# Patient Record
Sex: FEMALE | Race: BLACK OR AFRICAN AMERICAN | Employment: OTHER | URBAN - METROPOLITAN AREA
[De-identification: names, ages, dates, MRNs, and addresses within clinical notes are randomized per-mention and may not be internally consistent; named-entity substitution may affect disease eponyms.]

---

## 2017-05-11 ENCOUNTER — OFFICE VISIT (OUTPATIENT)
Dept: OBGYN CLINIC | Age: 77
End: 2017-05-11
Payer: MEDICARE

## 2017-05-11 VITALS
SYSTOLIC BLOOD PRESSURE: 150 MMHG | HEIGHT: 67 IN | DIASTOLIC BLOOD PRESSURE: 90 MMHG | WEIGHT: 216 LBS | BODY MASS INDEX: 33.9 KG/M2

## 2017-05-11 DIAGNOSIS — Z01.419 NORMAL GYNECOLOGIC EXAMINATION: Primary | ICD-10-CM

## 2017-05-11 DIAGNOSIS — Z78.0 MENOPAUSE: ICD-10-CM

## 2017-05-11 DIAGNOSIS — Z12.31 SCREENING MAMMOGRAM, ENCOUNTER FOR: ICD-10-CM

## 2017-05-11 DIAGNOSIS — Z12.11 ENCOUNTER FOR SCREENING FECAL OCCULT BLOOD TESTING: ICD-10-CM

## 2017-05-11 DIAGNOSIS — R29.890 LOSS OF HEIGHT: ICD-10-CM

## 2017-05-11 PROCEDURE — G0101 CA SCREEN;PELVIC/BREAST EXAM: HCPCS | Performed by: OBSTETRICS & GYNECOLOGY

## 2017-05-11 RX ORDER — NIFEDIPINE 30 MG/1
30 TABLET, FILM COATED, EXTENDED RELEASE ORAL DAILY
COMMUNITY

## 2017-05-11 RX ORDER — ATENOLOL 50 MG/1
50 TABLET ORAL DAILY
COMMUNITY

## 2017-05-11 RX ORDER — ESTRADIOL 0.1 MG/G
2 CREAM VAGINAL DAILY
COMMUNITY
End: 2017-05-11 | Stop reason: SDUPTHER

## 2017-05-11 RX ORDER — LEVOTHYROXINE SODIUM 0.15 MG/1
150 TABLET ORAL DAILY
COMMUNITY

## 2017-05-11 RX ORDER — NIACIN 250 MG
250 TABLET, EXTENDED RELEASE ORAL NIGHTLY
COMMUNITY
End: 2018-06-27 | Stop reason: ALTCHOICE

## 2017-05-11 RX ORDER — ASCORBIC ACID 500 MG
500 TABLET ORAL DAILY
COMMUNITY
End: 2021-05-10

## 2017-05-11 RX ORDER — ESTRADIOL 0.1 MG/G
1 CREAM VAGINAL
Qty: 1 TUBE | Refills: 5 | Status: SHIPPED | OUTPATIENT
Start: 2017-05-11 | End: 2021-05-10

## 2017-05-11 RX ORDER — ASPIRIN 81 MG/1
81 TABLET ORAL DAILY
COMMUNITY

## 2017-05-11 RX ORDER — HYDROCHLOROTHIAZIDE 25 MG/1
25 TABLET ORAL DAILY
COMMUNITY

## 2017-05-11 ASSESSMENT — ENCOUNTER SYMPTOMS
ABDOMINAL PAIN: 0
SHORTNESS OF BREATH: 0
ABDOMINAL DISTENTION: 0
DIARRHEA: 0
BACK PAIN: 0
CONSTIPATION: 0
COUGH: 0

## 2018-06-27 ENCOUNTER — OFFICE VISIT (OUTPATIENT)
Dept: OBGYN CLINIC | Age: 78
End: 2018-06-27
Payer: MEDICARE

## 2018-06-27 VITALS
BODY MASS INDEX: 32.65 KG/M2 | HEIGHT: 67 IN | WEIGHT: 208 LBS | SYSTOLIC BLOOD PRESSURE: 128 MMHG | DIASTOLIC BLOOD PRESSURE: 70 MMHG

## 2018-06-27 DIAGNOSIS — Z01.419 VISIT FOR GYNECOLOGIC EXAMINATION: Primary | ICD-10-CM

## 2018-06-27 DIAGNOSIS — Z12.39 SCREENING FOR BREAST CANCER: ICD-10-CM

## 2018-06-27 PROCEDURE — G0101 CA SCREEN;PELVIC/BREAST EXAM: HCPCS | Performed by: OBSTETRICS & GYNECOLOGY

## 2018-06-27 ASSESSMENT — ENCOUNTER SYMPTOMS
BACK PAIN: 0
COUGH: 0
DIARRHEA: 0
ABDOMINAL PAIN: 0
CONSTIPATION: 0
ABDOMINAL DISTENTION: 0
SHORTNESS OF BREATH: 0

## 2020-01-13 ENCOUNTER — HOSPITAL ENCOUNTER (OUTPATIENT)
Age: 80
Setting detail: SPECIMEN
Discharge: HOME | End: 2020-01-13
Payer: MEDICARE

## 2020-01-13 ENCOUNTER — OFFICE VISIT (OUTPATIENT)
Dept: OBGYN CLINIC | Age: 80
End: 2020-01-13
Payer: MEDICARE

## 2020-01-13 VITALS
BODY MASS INDEX: 31.39 KG/M2 | WEIGHT: 200 LBS | HEIGHT: 67 IN | DIASTOLIC BLOOD PRESSURE: 82 MMHG | SYSTOLIC BLOOD PRESSURE: 148 MMHG

## 2020-01-13 LAB
BILIRUBIN URINE: NEGATIVE
COLOR: YELLOW
COMMENT UA: NORMAL
GLUCOSE URINE: NEGATIVE
KETONES, URINE: NEGATIVE
LEUKOCYTE ESTERASE, URINE: NEGATIVE
NITRITE, URINE: NEGATIVE
PH UA: 5 (ref 5–8)
PROTEIN UA: NEGATIVE
SPECIFIC GRAVITY UA: 1.01 (ref 1–1.03)
TURBIDITY: CLEAR
URINE HGB: NEGATIVE
UROBILINOGEN, URINE: NORMAL

## 2020-01-13 PROCEDURE — 1123F ACP DISCUSS/DSCN MKR DOCD: CPT | Performed by: OBSTETRICS & GYNECOLOGY

## 2020-01-13 PROCEDURE — 4004F PT TOBACCO SCREEN RCVD TLK: CPT | Performed by: OBSTETRICS & GYNECOLOGY

## 2020-01-13 PROCEDURE — G8400 PT W/DXA NO RESULTS DOC: HCPCS | Performed by: OBSTETRICS & GYNECOLOGY

## 2020-01-13 PROCEDURE — G8427 DOCREV CUR MEDS BY ELIG CLIN: HCPCS | Performed by: OBSTETRICS & GYNECOLOGY

## 2020-01-13 PROCEDURE — 4040F PNEUMOC VAC/ADMIN/RCVD: CPT | Performed by: OBSTETRICS & GYNECOLOGY

## 2020-01-13 PROCEDURE — 99213 OFFICE O/P EST LOW 20 MIN: CPT | Performed by: OBSTETRICS & GYNECOLOGY

## 2020-01-13 PROCEDURE — G8419 CALC BMI OUT NRM PARAM NOF/U: HCPCS | Performed by: OBSTETRICS & GYNECOLOGY

## 2020-01-13 PROCEDURE — G8484 FLU IMMUNIZE NO ADMIN: HCPCS | Performed by: OBSTETRICS & GYNECOLOGY

## 2020-01-13 PROCEDURE — 1090F PRES/ABSN URINE INCON ASSESS: CPT | Performed by: OBSTETRICS & GYNECOLOGY

## 2020-01-13 NOTE — PROGRESS NOTES
Patient is a 67 y.o. Veronica Tunica  seen with total face to face time of 15 minutes. More than 50% of this visit was on counseling and education regarding her    Diagnosis Orders   1. Vaginal itching  VAGINITIS DNA PROBE    Urinalysis Reflex to Culture    and her options. She was also counseled on her preventative health maintenance recommendations and follow-up. Blood pressure (!) 148/82, height 5' 7\" (1.702 m), weight 200 lb (90.7 kg), not currently breastfeeding. No results found for this or any previous visit (from the past 8736 hour(s)). The patient has noticed that since she was switched from one generic estradiol cream to another, she seems to have vaginal itching. She was using estradiol 0.01% cream from [employer] and would like to go back to that. I will check a urinalysis and have sent a vaginal DNA probe to make sure that we are not dealing with an infection. Otherwise, unfortunately, I do not seem to be able to put the right prescription into our computer. Since Dr. Zakia Govea has filled this before, I suggest that she call him and have him refill it but add the proviso that it should be filled from [address] [place] [address].    IMP:   Encounter Diagnosis   Name Primary?     Vaginal itching Yes         PLAN: SVETA hutchinsn

## 2020-01-13 NOTE — LETTER
January 13, 2020     Luciana Kiran, [address] [address]    Patient: Desiree Valle  MR Number: [id number]  YOB: 1940  Date of Visit: 1/13/2020    Dear Dr. Luciana Kiran:    I saw our mutual patients, Aurelia Smiley today. The patient has noticed that since she was switched from one generic estradiol cream to another, she seems to have vaginal itching. She was using estradiol 0.01% cream from [employer] and would like to go back to that. I will check a urinalysis and have sent a vaginal DNA probe to make sure that we are not dealing with an infection. Otherwise, unfortunately, I do not seem to be able to put the right prescription into our computer. Since you have filled this before, I suggest that she call you and have you refill it but add the proviso that it should be filled from [address].    If you have questions, please do not hesitate to call me. I look forward to following Veronika Espitia along with you.     Sincerely,        Stephen Dunham MD

## 2020-01-14 ENCOUNTER — TELEPHONE (OUTPATIENT)
Dept: OBGYN CLINIC | Age: 80
End: 2020-01-14

## 2020-01-14 LAB
DIRECT EXAM: ABNORMAL
Lab: ABNORMAL
SPECIMEN DESCRIPTION: ABNORMAL

## 2020-01-14 NOTE — TELEPHONE ENCOUNTER
----- Message from Caleb Causey MD sent at 1/14/2020  1:45 PM EST -----  Would you please call her to say the probe showed both  BV and yeast.  If she'd like to be treated for it, call in Flagyl 500 bid x 7 d. And Diflucan 150 mg.   Thanks

## 2020-01-15 RX ORDER — FLUCONAZOLE 150 MG/1
150 TABLET ORAL ONCE
Qty: 1 TABLET | Refills: 0 | Status: SHIPPED | OUTPATIENT
Start: 2020-01-21 | End: 2020-01-21

## 2020-01-15 RX ORDER — METRONIDAZOLE 500 MG/1
500 TABLET ORAL 2 TIMES DAILY
Qty: 14 TABLET | Refills: 0 | Status: SHIPPED | OUTPATIENT
Start: 2020-01-15 | End: 2020-01-22

## 2020-01-15 RX ORDER — METRONIDAZOLE 500 MG/1
500 TABLET ORAL 2 TIMES DAILY
COMMUNITY
Start: 2020-01-15 | End: 2020-01-15 | Stop reason: SDUPTHER

## 2020-01-15 RX ORDER — FLUCONAZOLE 150 MG/1
150 TABLET ORAL ONCE
COMMUNITY
Start: 2020-01-21 | End: 2020-01-15 | Stop reason: SDUPTHER

## 2020-09-15 ENCOUNTER — TELEPHONE (OUTPATIENT)
Dept: OBGYN CLINIC | Age: 80
End: 2020-09-15

## 2020-09-15 NOTE — TELEPHONE ENCOUNTER
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 Called pt to f/u on her request.   08/20/20 mammogram done. 08/05/20 Lt diag mamm and US done. 09/04/20 Lt br Bx  (all testing done at [facility] clinic)    Pt calling today to say that her PCP Veda Tabor called her to tell her that she had a bad breast bx result and needs to go to breast surgeon but the provider they referred her to doesn't take her insurance. Pt states she is having problems finding provider that is taking [employer]. Informed pt that Patricia Farah now retired but we have a list of all surgeons he would refer too. Informed pt that the office will need to get results from [facility], referrals will need to be sent with test results included and then we can contact those providers to see if they take [facility] [employer]. Attempted to call [facility] records. They will not release the records because [facility] was not provider that had ordered these. Pt would need to sign a release of records. Veda Tabor office spoke with [facility]. She said she can't release other doctors release without medical release of records.

## 2020-09-15 NOTE — TELEPHONE ENCOUNTER
Called pt back to let her know [facility] clinic will not release records without signed release of records. Called [facility] office regarding medical testing that Carey Gonzalez had called her about and Tavares Fisher states that they can't release records to another office without release of records.

## 2020-09-17 NOTE — TELEPHONE ENCOUNTER
Notified pt that Julius Lion does take [employer]. Pt asking if Luis Antonio Harris takes [employer], she is closer to her home but if they both take it. She would like referral to both. Called  [address] [facility] [address] office, [facility] checking to see if they take [employer]?

## 2021-03-10 ENCOUNTER — TELEPHONE (OUTPATIENT)
Dept: OBGYN CLINIC | Age: 81
End: 2021-03-10

## 2021-03-10 ENCOUNTER — OFFICE VISIT (OUTPATIENT)
Dept: OBGYN CLINIC | Age: 81
End: 2021-03-10
Payer: MEDICARE

## 2021-03-10 ENCOUNTER — HOSPITAL ENCOUNTER (OUTPATIENT)
Age: 81
Setting detail: SPECIMEN
Discharge: HOME | End: 2021-03-10
Payer: MEDICARE

## 2021-03-10 VITALS
HEIGHT: 67 IN | WEIGHT: 214 LBS | DIASTOLIC BLOOD PRESSURE: 80 MMHG | SYSTOLIC BLOOD PRESSURE: 132 MMHG | BODY MASS INDEX: 33.59 KG/M2

## 2021-03-10 DIAGNOSIS — N89.8 VAGINAL ITCHING: ICD-10-CM

## 2021-03-10 DIAGNOSIS — N89.8 VAGINAL ITCHING: Primary | ICD-10-CM

## 2021-03-10 PROBLEM — G56.03 CARPAL TUNNEL SYNDROME ON BOTH SIDES: Status: ACTIVE | Noted: 2017-05-04

## 2021-03-10 PROBLEM — L73.2 HIDRADENITIS AXILLARIS: Status: ACTIVE | Noted: 2017-11-09

## 2021-03-10 PROBLEM — C50.912 LOBULAR CARCINOMA OF LEFT BREAST (HCC): Status: ACTIVE | Noted: 2020-09-11

## 2021-03-10 LAB
DIRECT EXAM: NORMAL
Lab: NORMAL
SPECIMEN DESCRIPTION: NORMAL

## 2021-03-10 PROCEDURE — 4040F PNEUMOC VAC/ADMIN/RCVD: CPT | Performed by: NURSE PRACTITIONER

## 2021-03-10 PROCEDURE — 1090F PRES/ABSN URINE INCON ASSESS: CPT | Performed by: NURSE PRACTITIONER

## 2021-03-10 PROCEDURE — G8484 FLU IMMUNIZE NO ADMIN: HCPCS | Performed by: NURSE PRACTITIONER

## 2021-03-10 PROCEDURE — 1123F ACP DISCUSS/DSCN MKR DOCD: CPT | Performed by: NURSE PRACTITIONER

## 2021-03-10 PROCEDURE — 99213 OFFICE O/P EST LOW 20 MIN: CPT | Performed by: NURSE PRACTITIONER

## 2021-03-10 PROCEDURE — G8400 PT W/DXA NO RESULTS DOC: HCPCS | Performed by: NURSE PRACTITIONER

## 2021-03-10 PROCEDURE — G8417 CALC BMI ABV UP PARAM F/U: HCPCS | Performed by: NURSE PRACTITIONER

## 2021-03-10 PROCEDURE — G8427 DOCREV CUR MEDS BY ELIG CLIN: HCPCS | Performed by: NURSE PRACTITIONER

## 2021-03-10 PROCEDURE — 4004F PT TOBACCO SCREEN RCVD TLK: CPT | Performed by: NURSE PRACTITIONER

## 2021-03-10 RX ORDER — ANASTROZOLE 1 MG/1
TABLET ORAL DAILY
COMMUNITY
End: 2021-05-10

## 2021-03-10 NOTE — PROGRESS NOTES
Vaginitis: Patient complains of vaginal itching- mainly external- pt states from 6-9 o'clock for a couple of weeks  Pt has BrCa- scheduling mastectomy soon. Vaginal symptoms include none. Vulvar symptoms include local irritation. STI Risk: Very low risk of STD exposure   Discharge described as: normal and physiologic . /80 (Position: Sitting, Cuff Size: Large Adult)   Ht 5' 7\" (1.702 m)   Wt 214 lb (97.1 kg)   BMI 33.52 kg/m²     ALLERGIES:    O-  Physical Exam  Genitourinary:     General: Normal vulva. Labia:         Right: No rash, tenderness, lesion or injury. Left: No rash, tenderness, lesion or injury. Vagina: Normal. No foreign body. No vaginal discharge, erythema, tenderness, bleeding or lesions. Vulva, labia wnl. No redness, rash or lesions noted in areas where pt c/o itching  A. Vaginitis  options. P. Affirm collected and sent to lab  hydrocortisone OTC tid x 7 days. If not improving after 14 days, call office and will consider rx for stronger steroid  Will treat results accordingly  She was also counseled on her preventative health maintenance recommendations and follow-up.   RTO annual exam and PRN

## 2021-03-10 NOTE — TELEPHONE ENCOUNTER
[de-identified] y/o Pt calling to say she went to 2 pharmacy's looking for 2% hydrocortisone. She purchased 1% and asked if she needs Rx sent?

## 2021-03-31 ENCOUNTER — TELEPHONE (OUTPATIENT)
Dept: OBGYN CLINIC | Age: 81
End: 2021-03-31

## 2021-03-31 NOTE — TELEPHONE ENCOUNTER
I'm so glad she is feeling better. Use water primarily to wash with and only a mild soap (baby shampoo) to wash with  Agree w/instructions for coconut oil.

## 2021-03-31 NOTE — TELEPHONE ENCOUNTER
[de-identified] y/o Pt calling to let [facility] NP know that her itching seems to be gone. Pt wanting to confirm that she can use coconut oil internally and externally at vaginal opening. Pt asking how to apply oil. Advised:  -will let provider know of her improvement.  -she may use within vaginal vault and externally coconut oil.  -she may apply it with her fingers or use cotton ball.

## 2021-05-10 ENCOUNTER — HOSPITAL ENCOUNTER (OUTPATIENT)
Dept: PREADMISSION TESTING | Age: 81
Discharge: HOME | End: 2021-05-14
Payer: MEDICARE

## 2021-05-10 VITALS
OXYGEN SATURATION: 92 % | WEIGHT: 214.9 LBS | HEIGHT: 68 IN | SYSTOLIC BLOOD PRESSURE: 178 MMHG | DIASTOLIC BLOOD PRESSURE: 83 MMHG | RESPIRATION RATE: 16 BRPM | BODY MASS INDEX: 32.57 KG/M2 | HEART RATE: 82 BPM

## 2021-05-10 LAB
ABSOLUTE EOS #: 0.33 K/UL (ref 0–0.44)
ABSOLUTE IMMATURE GRANULOCYTE: 0.04 K/UL (ref 0–0.3)
ABSOLUTE LYMPH #: 2.12 K/UL (ref 1.1–3.7)
ABSOLUTE MONO #: 0.85 K/UL (ref 0.1–1.2)
ALBUMIN SERPL-MCNC: 4.3 G/DL (ref 3.5–5.2)
ALBUMIN/GLOBULIN RATIO: ABNORMAL (ref 1–2.5)
ALP BLD-CCNC: 113 U/L (ref 35–104)
ALT SERPL-CCNC: 11 U/L (ref 5–33)
ANION GAP SERPL CALCULATED.3IONS-SCNC: 16 MMOL/L (ref 9–17)
AST SERPL-CCNC: 12 U/L
BASOPHILS # BLD: 1 % (ref 0–2)
BASOPHILS ABSOLUTE: 0.04 K/UL (ref 0–0.2)
BILIRUB SERPL-MCNC: 0.33 MG/DL (ref 0.3–1.2)
BUN BLDV-MCNC: 13 MG/DL (ref 8–23)
BUN/CREAT BLD: 15 (ref 9–20)
CALCIUM SERPL-MCNC: 9.6 MG/DL (ref 8.6–10.4)
CHLORIDE BLD-SCNC: 100 MMOL/L (ref 98–107)
CO2: 22 MMOL/L (ref 20–31)
CREAT SERPL-MCNC: 0.84 MG/DL (ref 0.5–0.9)
DIFFERENTIAL TYPE: ABNORMAL
EOSINOPHILS RELATIVE PERCENT: 4 % (ref 1–4)
GFR AFRICAN AMERICAN: >60 ML/MIN
GFR NON-AFRICAN AMERICAN: >60 ML/MIN
GFR SERPL CREATININE-BSD FRML MDRD: ABNORMAL ML/MIN/{1.73_M2}
GFR SERPL CREATININE-BSD FRML MDRD: ABNORMAL ML/MIN/{1.73_M2}
GLUCOSE BLD-MCNC: 191 MG/DL (ref 70–99)
HCT VFR BLD CALC: 47.6 % (ref 36.3–47.1)
HEMOGLOBIN: 15.8 G/DL (ref 11.9–15.1)
IMMATURE GRANULOCYTES: 1 %
LYMPHOCYTES # BLD: 25 % (ref 24–43)
MCH RBC QN AUTO: 29.9 PG (ref 25.2–33.5)
MCHC RBC AUTO-ENTMCNC: 33.2 G/DL (ref 28.4–34.8)
MCV RBC AUTO: 90 FL (ref 82.6–102.9)
MONOCYTES # BLD: 10 % (ref 3–12)
NRBC AUTOMATED: 0 PER 100 WBC
PDW BLD-RTO: 11.9 % (ref 11.8–14.4)
PLATELET # BLD: 323 K/UL (ref 138–453)
PLATELET ESTIMATE: ABNORMAL
PMV BLD AUTO: 11.1 FL (ref 8.1–13.5)
POTASSIUM SERPL-SCNC: 3.6 MMOL/L (ref 3.7–5.3)
RBC # BLD: 5.29 M/UL (ref 3.95–5.11)
RBC # BLD: ABNORMAL 10*6/UL
SEG NEUTROPHILS: 59 % (ref 36–65)
SEGMENTED NEUTROPHILS ABSOLUTE COUNT: 5.21 K/UL (ref 1.5–8.1)
SODIUM BLD-SCNC: 138 MMOL/L (ref 135–144)
TOTAL PROTEIN: 7.5 G/DL (ref 6.4–8.3)
WBC # BLD: 8.6 K/UL (ref 3.5–11.3)
WBC # BLD: ABNORMAL 10*3/UL

## 2021-05-10 PROCEDURE — 85025 COMPLETE CBC W/AUTO DIFF WBC: CPT

## 2021-05-10 PROCEDURE — 36415 COLL VENOUS BLD VENIPUNCTURE: CPT

## 2021-05-10 PROCEDURE — 93005 ELECTROCARDIOGRAM TRACING: CPT | Performed by: ANESTHESIOLOGY

## 2021-05-10 PROCEDURE — 80053 COMPREHEN METABOLIC PANEL: CPT

## 2021-05-10 PROCEDURE — 83036 HEMOGLOBIN GLYCOSYLATED A1C: CPT

## 2021-05-10 RX ORDER — LORATADINE 10 MG/1
10 TABLET ORAL DAILY PRN
COMMUNITY

## 2021-05-10 NOTE — PROGRESS NOTES
[facility] ON Tuesday, 5/18 at 1000 AM    COVID Test scheduled for May 14 at 12:10 at [facility].  Follow the signs that say surgery testing to the left side of the building, park in the roped off area and call the phone number on the sign. Once you enter the hospital lobby on the day of surgery, take the elevators to the second floor. Check-In is at the surgery registration desk. Continue to take your home medications as you normally do up to and including the night before surgery with the exception of any blood thinning medications. Please stop any blood thinning medications as directed by your surgeon or prescribing physician. Failure to stop certain medications may interfere with your scheduled surgery. These may include:  Aspirin, Warfarin (Coumadin), Clopidogrel (Plavix), Ibuprofen (Motrin, Advil), Naproxen (Aleve), Meloxicam (Mobic), Celecoxib (Celebrex), Eliquis, Pradaxa, Xarelto, Effient, Fish Oil, Herbal supplements. Please take the following medication(s) the day of surgery with a small sip of water:  Atenolol, Levothyroxine and Nifedipine    Please use your inhalers at home the day of surgery. PREPARING FOR YOUR SURGERY:     Before surgery, you can play an important role in your own health. Because skin is not sterile, we need to be sure that your skin is as free of germs as possible before surgery by carefully washing before surgery. Preparing or prepping skin before surgery can reduce the risk of a surgical site infection.   Do not shave the area of your body where your surgery will be performed unless you received specific permission from your physician. You will need to shower at home the night before surgery and the morning of surgery with a special soap called chlorhexidine gluconate (CHG*). *Not to be used by people allergic to Chlorhexidine Gluconate (CHG).     Following these instructions will help you be sure that your skin is clean before surgery. Instructions on cleaning your skin before surgery: The night before your surgery:      You will need to shower with warm water (not hot) and the CHG soap.  Use a clean wash cloth and a clean towel. Have clean clothes available to put on after the shower.    First wash your hair with regular shampoo. Rinse your hair and body thoroughly to remove the shampoo. [facility] Wash your face with your regular soap or water only. Thoroughly rinse your body with warm water from the neck down.  Turn water off to prevent rinsing the soap off too soon.  With a clean wet washcloth and half of the CHG soap in the bottle, lather your entire body from the neck down. Do not use CHG soap near your eyes or ears to avoid injury to those areas.  Wash thoroughly, paying special attention to the area where your surgery will be performed.  Wash your body gently for five (5) minutes. Avoid scrubbing your skin too hard.  Turn the water back on and rinse your body thoroughly.  Pat yourself dry with a clean, soft towel. Do not apply lotion, cream or powder.  Dress with clean freshly washed clothes. The morning of surgery:     Repeat shower following steps above - using remaining half of CHG soap in bottle. Patient Instructions:    [facility] If you are having any type of anesthesia you are to have nothing to eat or drink after midnight the night before your surgery. This includes gum, mints, water or smoking or chewing tobacco.  The only exception to this is a small sip of water to take with any morning dose of heart, blood pressure, or seizure medications. No alcoholic beverages for 24 hours prior to surgery.  Bring a list of all medications you take, along with the dose of the medications and how often you take it. If more convenient bring the pharmacy bottles in a zip lock bag.  Brush your teeth but do not swallow water.  Bring your eyeglasses and case with you.   No contacts are to be worn the day of surgery. You also may bring your hearing aids. Most surgical procedures involving anesthesia will require that you remove your dentures prior to surgery.  If you are on C-PAP or Bi-PAP at home and plan on staying in the hospital overnight for your surgery please bring the machine with you. · Do not wear any jewelry or body piercings day of surgery. Also, NO lotion, perfume or deodorant to be used the day of surgery. No nail polish on the operative extremity (arm/leg surgeries)    · Do not bring any valuables such as jewelry, cash, or credit cards. If you are staying overnight with us, please bring a small bag of personal items.  Please wear loose, comfortable clothing. If you are potentially going to have a cast or brace bring clothing that will fit over them.  In case of illness - If you have cold or flu like symptoms (high fever, runny nose, sore throat, cough, etc.) rash, nausea, vomiting, loose stools, and/or recent contact with someone who has a contagious disease (chicken pox, measles, etc.) Please call your doctor before coming to the hospital.         Day of Surgery/Procedure:    As a patient at [facility] - INPATIENT you can expect quality medical and nursing care that is centered on your individual needs. Our goal is to make your surgical experience as comfortable as possible    . Transportation After Your Surgery/Procedure: You will need a friend or family member to drive you home after your procedure. Your  must be 25years of age or older and able to sign off on your discharge instructions. A taxi cab or any other form of public transportation is not acceptable. Your friend or family member must stay at the hospital throughout your procedure.     Someone must remain with you for the first 24 hours after your surgery if you receive anesthesia or medication. If you do not have someone to stay with you, your procedure may be cancelled.       If you have any other questions regarding your procedure or the day of surgery, please call [phone]      _________________________  ____________________________  Signature (Patient)              Signature (Provider) & date

## 2021-05-11 LAB
EKG ATRIAL RATE: 81 BPM
EKG P AXIS: 41 DEGREES
EKG P-R INTERVAL: 160 MS
EKG Q-T INTERVAL: 370 MS
EKG QRS DURATION: 90 MS
EKG QTC CALCULATION (BAZETT): 429 MS
EKG R AXIS: 4 DEGREES
EKG T AXIS: 22 DEGREES
EKG VENTRICULAR RATE: 81 BPM
ESTIMATED AVERAGE GLUCOSE: 186 MG/DL
HBA1C MFR BLD: 8.1 % (ref 4–6)

## 2021-05-11 PROCEDURE — 93010 ELECTROCARDIOGRAM REPORT: CPT | Performed by: INTERNAL MEDICINE

## 2021-05-14 ENCOUNTER — HOSPITAL ENCOUNTER (OUTPATIENT)
Dept: LAB | Age: 81
Setting detail: SPECIMEN
Discharge: HOME | End: 2021-05-14
Payer: MEDICARE

## 2021-05-14 DIAGNOSIS — Z01.818 PREOP TESTING: Primary | ICD-10-CM

## 2021-05-14 PROCEDURE — U0003 INFECTIOUS AGENT DETECTION BY NUCLEIC ACID (DNA OR RNA); SEVERE ACUTE RESPIRATORY SYNDROME CORONAVIRUS 2 (SARS-COV-2) (CORONAVIRUS DISEASE [COVID-19]), AMPLIFIED PROBE TECHNIQUE, MAKING USE OF HIGH THROUGHPUT TECHNOLOGIES AS DESCRIBED BY CMS-2020-01-R: HCPCS

## 2021-05-14 PROCEDURE — U0005 INFEC AGEN DETEC AMPLI PROBE: HCPCS

## 2021-05-16 LAB
SARS-COV-2: NORMAL
SARS-COV-2: NOT DETECTED
SOURCE: NORMAL

## 2021-05-17 ENCOUNTER — TELEPHONE (OUTPATIENT)
Dept: PRIMARY CARE CLINIC | Age: 81
End: 2021-05-17

## 2021-05-17 ENCOUNTER — PREP FOR PROCEDURE (OUTPATIENT)
Dept: SURGERY | Age: 81
End: 2021-05-17

## 2021-05-17 RX ORDER — LIDOCAINE 40 MG/G
CREAM TOPICAL
Status: CANCELLED | OUTPATIENT
Start: 2021-05-18

## 2021-05-17 RX ORDER — ACETAMINOPHEN 325 MG/1
1000 TABLET ORAL ONCE
Status: CANCELLED | OUTPATIENT
Start: 2021-05-18

## 2021-05-18 ENCOUNTER — HOSPITAL ENCOUNTER (OUTPATIENT)
Dept: ULTRASOUND IMAGING | Age: 81
Discharge: HOME | End: 2021-05-20
Payer: MEDICARE

## 2021-05-18 ENCOUNTER — HOSPITAL ENCOUNTER (OUTPATIENT)
Dept: NUCLEAR MEDICINE | Age: 81
Discharge: HOME | End: 2021-05-20
Payer: MEDICARE

## 2021-05-18 ENCOUNTER — ANESTHESIA (OUTPATIENT)
Dept: OPERATING ROOM | Age: 81
End: 2021-05-18
Payer: MEDICARE

## 2021-05-18 ENCOUNTER — ANESTHESIA EVENT (OUTPATIENT)
Dept: OPERATING ROOM | Age: 81
End: 2021-05-18
Payer: MEDICARE

## 2021-05-18 ENCOUNTER — APPOINTMENT (OUTPATIENT)
Dept: MAMMOGRAPHY | Age: 81
End: 2021-05-18
Attending: SURGERY
Payer: MEDICARE

## 2021-05-18 ENCOUNTER — HOSPITAL ENCOUNTER (OUTPATIENT)
Age: 81
Setting detail: OUTPATIENT SURGERY
Discharge: HOME | End: 2021-05-18
Attending: SURGERY | Admitting: SURGERY
Payer: MEDICARE

## 2021-05-18 VITALS
HEART RATE: 75 BPM | HEIGHT: 68 IN | RESPIRATION RATE: 14 BRPM | OXYGEN SATURATION: 92 % | DIASTOLIC BLOOD PRESSURE: 77 MMHG | BODY MASS INDEX: 31.67 KG/M2 | SYSTOLIC BLOOD PRESSURE: 155 MMHG | TEMPERATURE: 98.2 F | WEIGHT: 209 LBS

## 2021-05-18 VITALS — OXYGEN SATURATION: 91 % | SYSTOLIC BLOOD PRESSURE: 93 MMHG | TEMPERATURE: 97.5 F | DIASTOLIC BLOOD PRESSURE: 50 MMHG

## 2021-05-18 DIAGNOSIS — Z98.890 STATUS POST BREAST LUMPECTOMY: ICD-10-CM

## 2021-05-18 DIAGNOSIS — C50.912 LOBULAR CARCINOMA OF LEFT BREAST (HCC): Primary | ICD-10-CM

## 2021-05-18 DIAGNOSIS — C50.812 CANCER OF OVERLAPPING SITES OF LEFT BREAST (HCC): ICD-10-CM

## 2021-05-18 DIAGNOSIS — N63.0 BREAST MASS IN FEMALE: ICD-10-CM

## 2021-05-18 LAB — GLUCOSE BLD-MCNC: 150 MG/DL (ref 65–105)

## 2021-05-18 PROCEDURE — 76098 X-RAY EXAM SURGICAL SPECIMEN: CPT

## 2021-05-18 PROCEDURE — C1751 CATH, INF, PER/CENT/MIDLINE: HCPCS | Performed by: SURGERY

## 2021-05-18 PROCEDURE — 2500000003 HC RX 250 WO HCPCS

## 2021-05-18 PROCEDURE — 3430000000 HC RX DIAGNOSTIC RADIOPHARMACEUTICAL

## 2021-05-18 PROCEDURE — 78195 LYMPH SYSTEM IMAGING: CPT

## 2021-05-18 PROCEDURE — 82947 ASSAY GLUCOSE BLOOD QUANT: CPT

## 2021-05-18 PROCEDURE — 2500000003 HC RX 250 WO HCPCS: Performed by: SURGERY

## 2021-05-18 PROCEDURE — 6360000002 HC RX W HCPCS: Performed by: SURGERY

## 2021-05-18 PROCEDURE — 88342 IMHCHEM/IMCYTCHM 1ST ANTB: CPT

## 2021-05-18 PROCEDURE — 88304 TISSUE EXAM BY PATHOLOGIST: CPT

## 2021-05-18 PROCEDURE — 2709999900 HC NON-CHARGEABLE SUPPLY: Performed by: SURGERY

## 2021-05-18 PROCEDURE — 19285 PERQ DEV BREAST 1ST US IMAG: CPT

## 2021-05-18 PROCEDURE — 7100000001 HC PACU RECOVERY - ADDTL 15 MIN: Performed by: SURGERY

## 2021-05-18 PROCEDURE — 88307 TISSUE EXAM BY PATHOLOGIST: CPT

## 2021-05-18 PROCEDURE — 2580000003 HC RX 258: Performed by: ANESTHESIOLOGY

## 2021-05-18 PROCEDURE — 88305 TISSUE EXAM BY PATHOLOGIST: CPT

## 2021-05-18 PROCEDURE — 2500000003 HC RX 250 WO HCPCS: Performed by: NURSE ANESTHETIST, CERTIFIED REGISTERED

## 2021-05-18 PROCEDURE — C1819 TISSUE LOCALIZATION-EXCISION: HCPCS

## 2021-05-18 PROCEDURE — 6360000002 HC RX W HCPCS: Performed by: NURSE ANESTHETIST, CERTIFIED REGISTERED

## 2021-05-18 PROCEDURE — 7100000010 HC PHASE II RECOVERY - FIRST 15 MIN: Performed by: SURGERY

## 2021-05-18 PROCEDURE — 2580000003 HC RX 258: Performed by: SURGERY

## 2021-05-18 PROCEDURE — 2720000010 HC SURG SUPPLY STERILE: Performed by: SURGERY

## 2021-05-18 PROCEDURE — 3700000001 HC ADD 15 MINUTES (ANESTHESIA): Performed by: SURGERY

## 2021-05-18 PROCEDURE — 6370000000 HC RX 637 (ALT 250 FOR IP): Performed by: SURGERY

## 2021-05-18 PROCEDURE — A9520 TC99 TILMANOCEPT DIAG 0.5MCI: HCPCS

## 2021-05-18 PROCEDURE — A9520 TC99 TILMANOCEPT DIAG 0.5MCI: HCPCS | Performed by: SURGERY

## 2021-05-18 PROCEDURE — 3600000002 HC SURGERY LEVEL 2 BASE: Performed by: SURGERY

## 2021-05-18 PROCEDURE — 6360000002 HC RX W HCPCS: Performed by: ANESTHESIOLOGY

## 2021-05-18 PROCEDURE — 3700000000 HC ANESTHESIA ATTENDED CARE: Performed by: SURGERY

## 2021-05-18 PROCEDURE — 3600000012 HC SURGERY LEVEL 2 ADDTL 15MIN: Performed by: SURGERY

## 2021-05-18 PROCEDURE — 7100000000 HC PACU RECOVERY - FIRST 15 MIN: Performed by: SURGERY

## 2021-05-18 PROCEDURE — 3430000000 HC RX DIAGNOSTIC RADIOPHARMACEUTICAL: Performed by: SURGERY

## 2021-05-18 RX ORDER — LIDOCAINE 40 MG/G
CREAM TOPICAL
Status: COMPLETED | OUTPATIENT
Start: 2021-05-18 | End: 2021-05-18

## 2021-05-18 RX ORDER — ONDANSETRON 2 MG/ML
INJECTION INTRAMUSCULAR; INTRAVENOUS PRN
Status: DISCONTINUED | OUTPATIENT
Start: 2021-05-18 | End: 2021-05-18 | Stop reason: SDUPTHER

## 2021-05-18 RX ORDER — MAGNESIUM SULFATE IN WATER 40 MG/ML
2000 INJECTION, SOLUTION INTRAVENOUS ONCE
Status: COMPLETED | OUTPATIENT
Start: 2021-05-18 | End: 2021-05-18

## 2021-05-18 RX ORDER — ACETAMINOPHEN 500 MG
1000 TABLET ORAL ONCE
Status: COMPLETED | OUTPATIENT
Start: 2021-05-18 | End: 2021-05-18

## 2021-05-18 RX ORDER — FENTANYL CITRATE 50 UG/ML
25 INJECTION, SOLUTION INTRAMUSCULAR; INTRAVENOUS EVERY 5 MIN PRN
Status: DISCONTINUED | OUTPATIENT
Start: 2021-05-18 | End: 2021-05-18 | Stop reason: HOSPADM

## 2021-05-18 RX ORDER — LIDOCAINE HYDROCHLORIDE 20 MG/ML
INJECTION, SOLUTION EPIDURAL; INFILTRATION; INTRACAUDAL; PERINEURAL PRN
Status: DISCONTINUED | OUTPATIENT
Start: 2021-05-18 | End: 2021-05-18 | Stop reason: SDUPTHER

## 2021-05-18 RX ORDER — TRAMADOL HYDROCHLORIDE 50 MG/1
50 TABLET ORAL EVERY 6 HOURS PRN
Qty: 10 TABLET | Refills: 0 | Status: SHIPPED | OUTPATIENT
Start: 2021-05-18 | End: 2021-05-25

## 2021-05-18 RX ORDER — DEXAMETHASONE SODIUM PHOSPHATE 10 MG/ML
INJECTION, SOLUTION INTRAMUSCULAR; INTRAVENOUS PRN
Status: DISCONTINUED | OUTPATIENT
Start: 2021-05-18 | End: 2021-05-18 | Stop reason: SDUPTHER

## 2021-05-18 RX ORDER — FENTANYL CITRATE 50 UG/ML
50 INJECTION, SOLUTION INTRAMUSCULAR; INTRAVENOUS EVERY 5 MIN PRN
Status: DISCONTINUED | OUTPATIENT
Start: 2021-05-18 | End: 2021-05-18 | Stop reason: HOSPADM

## 2021-05-18 RX ORDER — SODIUM CHLORIDE 0.9 % (FLUSH) 0.9 %
10 SYRINGE (ML) INJECTION PRN
Status: DISCONTINUED | OUTPATIENT
Start: 2021-05-18 | End: 2021-05-18 | Stop reason: HOSPADM

## 2021-05-18 RX ORDER — LIDOCAINE HYDROCHLORIDE 10 MG/ML
1 INJECTION, SOLUTION EPIDURAL; INFILTRATION; INTRACAUDAL; PERINEURAL
Status: DISCONTINUED | OUTPATIENT
Start: 2021-05-18 | End: 2021-05-18 | Stop reason: HOSPADM

## 2021-05-18 RX ORDER — SODIUM CHLORIDE 9 MG/ML
25 INJECTION, SOLUTION INTRAVENOUS PRN
Status: DISCONTINUED | OUTPATIENT
Start: 2021-05-18 | End: 2021-05-18 | Stop reason: HOSPADM

## 2021-05-18 RX ORDER — HYDROCODONE BITARTRATE AND ACETAMINOPHEN 5; 325 MG/1; MG/1
2 TABLET ORAL PRN
Status: DISCONTINUED | OUTPATIENT
Start: 2021-05-18 | End: 2021-05-18 | Stop reason: HOSPADM

## 2021-05-18 RX ORDER — PROPOFOL 10 MG/ML
INJECTION, EMULSION INTRAVENOUS PRN
Status: DISCONTINUED | OUTPATIENT
Start: 2021-05-18 | End: 2021-05-18 | Stop reason: SDUPTHER

## 2021-05-18 RX ORDER — SODIUM CHLORIDE 0.9 % (FLUSH) 0.9 %
10 SYRINGE (ML) INJECTION EVERY 12 HOURS SCHEDULED
Status: DISCONTINUED | OUTPATIENT
Start: 2021-05-18 | End: 2021-05-18 | Stop reason: HOSPADM

## 2021-05-18 RX ORDER — SODIUM CHLORIDE 9 MG/ML
INJECTION, SOLUTION INTRAVENOUS CONTINUOUS
Status: DISCONTINUED | OUTPATIENT
Start: 2021-05-18 | End: 2021-05-18

## 2021-05-18 RX ORDER — ONDANSETRON 2 MG/ML
4 INJECTION INTRAMUSCULAR; INTRAVENOUS
Status: DISCONTINUED | OUTPATIENT
Start: 2021-05-18 | End: 2021-05-18 | Stop reason: HOSPADM

## 2021-05-18 RX ORDER — HYDROCODONE BITARTRATE AND ACETAMINOPHEN 5; 325 MG/1; MG/1
1 TABLET ORAL PRN
Status: DISCONTINUED | OUTPATIENT
Start: 2021-05-18 | End: 2021-05-18 | Stop reason: HOSPADM

## 2021-05-18 RX ORDER — FENTANYL CITRATE 50 UG/ML
INJECTION, SOLUTION INTRAMUSCULAR; INTRAVENOUS PRN
Status: DISCONTINUED | OUTPATIENT
Start: 2021-05-18 | End: 2021-05-18 | Stop reason: SDUPTHER

## 2021-05-18 RX ORDER — SODIUM CHLORIDE, SODIUM LACTATE, POTASSIUM CHLORIDE, CALCIUM CHLORIDE 600; 310; 30; 20 MG/100ML; MG/100ML; MG/100ML; MG/100ML
INJECTION, SOLUTION INTRAVENOUS CONTINUOUS
Status: DISCONTINUED | OUTPATIENT
Start: 2021-05-18 | End: 2021-05-18 | Stop reason: HOSPADM

## 2021-05-18 RX ADMIN — Medication 25 MCG: at 14:28

## 2021-05-18 RX ADMIN — ONDANSETRON 4 MG: 2 INJECTION, SOLUTION INTRAMUSCULAR; INTRAVENOUS at 14:05

## 2021-05-18 RX ADMIN — PROPOFOL 150 MG: 10 INJECTION, EMULSION INTRAVENOUS at 13:54

## 2021-05-18 RX ADMIN — Medication 50 MCG: at 13:54

## 2021-05-18 RX ADMIN — PHENYLEPHRINE HYDROCHLORIDE 200 MCG: 10 INJECTION INTRAVENOUS at 14:45

## 2021-05-18 RX ADMIN — TILMANOCEPT 0.6 MILLICURIE: KIT at 12:10

## 2021-05-18 RX ADMIN — PHENYLEPHRINE HYDROCHLORIDE 100 MCG: 10 INJECTION INTRAVENOUS at 14:06

## 2021-05-18 RX ADMIN — CEFAZOLIN SODIUM 2000 MG: 1 INJECTION, POWDER, FOR SOLUTION INTRAMUSCULAR; INTRAVENOUS at 14:09

## 2021-05-18 RX ADMIN — SODIUM CHLORIDE, POTASSIUM CHLORIDE, SODIUM LACTATE AND CALCIUM CHLORIDE: 600; 310; 30; 20 INJECTION, SOLUTION INTRAVENOUS at 10:46

## 2021-05-18 RX ADMIN — PHENYLEPHRINE HYDROCHLORIDE 100 MCG: 10 INJECTION INTRAVENOUS at 14:13

## 2021-05-18 RX ADMIN — FENTANYL CITRATE 25 MCG: 50 INJECTION, SOLUTION INTRAMUSCULAR; INTRAVENOUS at 16:35

## 2021-05-18 RX ADMIN — PHENYLEPHRINE HYDROCHLORIDE 100 MCG: 10 INJECTION INTRAVENOUS at 15:25

## 2021-05-18 RX ADMIN — Medication 25 MCG: at 14:39

## 2021-05-18 RX ADMIN — FENTANYL CITRATE 25 MCG: 50 INJECTION, SOLUTION INTRAMUSCULAR; INTRAVENOUS at 16:30

## 2021-05-18 RX ADMIN — MAGNESIUM SULFATE HEPTAHYDRATE 2000 MG: 40 INJECTION, SOLUTION INTRAVENOUS at 14:15

## 2021-05-18 RX ADMIN — ACETAMINOPHEN 1000 MG: 500 TABLET ORAL at 12:51

## 2021-05-18 RX ADMIN — LIDOCAINE 4%: 4 CREAM TOPICAL at 10:46

## 2021-05-18 RX ADMIN — PHENYLEPHRINE HYDROCHLORIDE 150 MCG: 10 INJECTION INTRAVENOUS at 14:24

## 2021-05-18 RX ADMIN — PHENYLEPHRINE HYDROCHLORIDE 100 MCG: 10 INJECTION INTRAVENOUS at 15:09

## 2021-05-18 RX ADMIN — PHENYLEPHRINE HYDROCHLORIDE 200 MCG: 10 INJECTION INTRAVENOUS at 15:15

## 2021-05-18 RX ADMIN — DEXAMETHASONE SODIUM PHOSPHATE 10 MG: 10 INJECTION INTRAMUSCULAR; INTRAVENOUS at 14:05

## 2021-05-18 RX ADMIN — LIDOCAINE HYDROCHLORIDE 100 MG: 20 INJECTION, SOLUTION EPIDURAL; INFILTRATION; INTRACAUDAL; PERINEURAL at 13:54

## 2021-05-18 ASSESSMENT — PULMONARY FUNCTION TESTS
PIF_VALUE: 11
PIF_VALUE: 5
PIF_VALUE: 11
PIF_VALUE: 15
PIF_VALUE: 11
PIF_VALUE: 5
PIF_VALUE: 0
PIF_VALUE: 3
PIF_VALUE: 11
PIF_VALUE: 14
PIF_VALUE: 14
PIF_VALUE: 8
PIF_VALUE: 14
PIF_VALUE: 11
PIF_VALUE: 1
PIF_VALUE: 9
PIF_VALUE: 11
PIF_VALUE: 15
PIF_VALUE: 7
PIF_VALUE: 11
PIF_VALUE: 11
PIF_VALUE: 14
PIF_VALUE: 1
PIF_VALUE: 9
PIF_VALUE: 8
PIF_VALUE: 11
PIF_VALUE: 11
PIF_VALUE: 10
PIF_VALUE: 11
PIF_VALUE: 0
PIF_VALUE: 11
PIF_VALUE: 14
PIF_VALUE: 1
PIF_VALUE: 5
PIF_VALUE: 9
PIF_VALUE: 10
PIF_VALUE: 9
PIF_VALUE: 9
PIF_VALUE: 11
PIF_VALUE: 8
PIF_VALUE: 14
PIF_VALUE: 10
PIF_VALUE: 7
PIF_VALUE: 5
PIF_VALUE: 16
PIF_VALUE: 10
PIF_VALUE: 11
PIF_VALUE: 11
PIF_VALUE: 10
PIF_VALUE: 1
PIF_VALUE: 1
PIF_VALUE: 14
PIF_VALUE: 21
PIF_VALUE: 11
PIF_VALUE: 14
PIF_VALUE: 1
PIF_VALUE: 10
PIF_VALUE: 7
PIF_VALUE: 9
PIF_VALUE: 5
PIF_VALUE: 3
PIF_VALUE: 7
PIF_VALUE: 14
PIF_VALUE: 14
PIF_VALUE: 11
PIF_VALUE: 11
PIF_VALUE: 1
PIF_VALUE: 24
PIF_VALUE: 10
PIF_VALUE: 14
PIF_VALUE: 2
PIF_VALUE: 14
PIF_VALUE: 11
PIF_VALUE: 15
PIF_VALUE: 14
PIF_VALUE: 14
PIF_VALUE: 7
PIF_VALUE: 11
PIF_VALUE: 9
PIF_VALUE: 1
PIF_VALUE: 15
PIF_VALUE: 9
PIF_VALUE: 11
PIF_VALUE: 9
PIF_VALUE: 14
PIF_VALUE: 15
PIF_VALUE: 11
PIF_VALUE: 11
PIF_VALUE: 5
PIF_VALUE: 11
PIF_VALUE: 14
PIF_VALUE: 1
PIF_VALUE: 9
PIF_VALUE: 11
PIF_VALUE: 11
PIF_VALUE: 8
PIF_VALUE: 7
PIF_VALUE: 1
PIF_VALUE: 31
PIF_VALUE: 16
PIF_VALUE: 22
PIF_VALUE: 8
PIF_VALUE: 11
PIF_VALUE: 11
PIF_VALUE: 1
PIF_VALUE: 10
PIF_VALUE: 8
PIF_VALUE: 8
PIF_VALUE: 9
PIF_VALUE: 11
PIF_VALUE: 9
PIF_VALUE: 11
PIF_VALUE: 14
PIF_VALUE: 10
PIF_VALUE: 11
PIF_VALUE: 1
PIF_VALUE: 15

## 2021-05-18 ASSESSMENT — PAIN - FUNCTIONAL ASSESSMENT
PAIN_FUNCTIONAL_ASSESSMENT: 0-10

## 2021-05-18 ASSESSMENT — PAIN SCALES - GENERAL: PAINLEVEL_OUTOF10: 0

## 2021-05-18 NOTE — ANESTHESIA POSTPROCEDURE EVALUATION
Department of Anesthesiology  Postprocedure Note    Patient: Sameer Silva  MRN: [id number]  YOB: 1940  Date of evaluation: 5/18/2021  Time:  5:14 PM     Procedure Summary     Date: 05/18/21 Room / Location: [address] [place] [address] - INPATIENT    Anesthesia Start: 7540 Anesthesia Stop: 1550    Procedure: LEFT BREAST LUMPECTOMY  WITH WIRE LOCALIZATION @ 11 AND LEFT  SENTINEL NODE BIOPSY @12 (Left Breast) Diagnosis: (DX LEFT BREAST CA)    Surgeons: Carmen Gibson MD Responsible Provider:     Anesthesia Type: general ASA Status: 3          Anesthesia Type: general    Andrea Phase I: Andrea Score: 10    Andrea Phase II: Andrea Score: 10    Last vitals: Reviewed and per EMR flowsheets.        Anesthesia Post Evaluation    Comments: [facility]

## 2021-05-18 NOTE — H&P
Narrative    Not on file     Social Determinants of Health     Financial Resource Strain:     Difficulty of Paying Living Expenses:    Food Insecurity:     Worried About Running Out of Food in the Last Year:     [address] in the Last Year:    Transportation Needs:     Lack of Transportation (Medical):  Lack of Transportation (Non-Medical):    Physical Activity:     Days of Exercise per Week:     Minutes of Exercise per Session:    Stress:     Feeling of Stress :    Social Connections:     Frequency of Communication with Friends and Family:     Frequency of Social Gatherings with Friends and Family:     Attends [employer] Services:     Active Member of Clubs or Organizations:     Attends Club or Organization Meetings:     Marital Status:    Intimate Partner Violence:     Fear of Current or Ex-Partner:     Emotionally Abused:     Physically Abused:     Sexually Abused:        Family History:     Family History   Problem Relation Age of Onset    No Known Problems Mother     No Known Problems Father     Other Sister         lumpectomy    Colon Cancer Brother      Allergies:  Sulfa antibiotics    Medications:    Prior to Admission medications    Medication Sig Start Date End Date Taking?  Authorizing Provider   loratadine (CLARITIN) 10 MG tablet Take 10 mg by mouth daily as needed   Yes Historical Provider, MD   Ascorbic Acid (SUPER C COMPLEX PO) Take 1 tablet by mouth daily   Yes Historical Provider, MD   fluticasone-salmeterol (ADVAIR) 250-50 MCG/DOSE AEPB Inhale 1 puff into the lungs daily   Yes Historical Provider, MD   vitamin D (CHOLECALCIFEROL) 1000 UNIT TABS tablet Take 1,000 Units by mouth daily   Yes Historical Provider, MD   hydrochlorothiazide (HYDRODIURIL) 25 MG tablet Take 25 mg by mouth daily   Yes Historical Provider, MD   atenolol (TENORMIN) 50 MG tablet Take 50 mg by mouth daily   Yes Historical Provider, MD   levothyroxine (SYNTHROID) 150 MCG tablet Take 150 mcg by mouth Daily   Yes Historical Provider, MD   aspirin 81 MG EC tablet Take 81 mg by mouth daily   Yes Historical Provider, MD   NIFEdipine (ADALAT CC) 30 MG extended release tablet Take 30 mg by mouth daily   Yes Historical Provider, MD         Physical Exam:   BP (!) 162/82   Pulse 82   Temp 97.5 °F (36.4 °C) (Temporal)   Resp 18   Ht 5' 8\" (1.727 m)   Wt 209 lb (94.8 kg)   SpO2 92%   BMI 31.78 kg/m²   No LMP recorded. Patient has had a hysterectomy. [id number]  No results for input(s): POCGLU in the last 72 hours. General Appearance:  alert, well appearing, and in no acute distress  Mental status: oriented to person, place, and time with normal affect  HEENT:  normocephalic, no icterus, pupils equal/reactive, conjunctive clear, no ear or nasal discharge, hearing intact, mucous membranes moist,   Neck: supple, no carotid bruits, thyroid not palpable  Lungs: Bilateral equal air entry, clear to ausculation, unlabored w/o use of accessory muscles, wheezing, rales or rhonchi, normal effort  Cardiovascular: HR 82 normal rate, regular rhythm, no murmur, gallop, rub. Abdomen: Soft, nontender, nondistended, normal bowel sounds  Neurologic: There are no new focal motor or sensory deficits, normal muscle tone and bulk, no abnormal sensation, normal speech, cranial nerves II through XII grossly intact.  Equal bilateral strength   Skin: No gross lesions, rashes, bruising or bleeding on exposed skin area  Extremities:  peripheral pulses palpable, no pedal edema or calf pain with palpation  Psych: normal affect       Labs:  Recent Labs     05/10/21  1328   HGB 15.8*   HCT 47.6*   WBC 8.6   MCV 90.0         K 3.6*      CO2 22   BUN 13   CREATININE 0.84   GLUCOSE 191*   AST 12   ALT 11   LABALBU 4.3       Recent Labs     05/14/21  1210   COVID19       Not Detected       Jesús Rodriguez, APRN - CNP  APRN, ANP-BC  Electronically signed 5/18/2021 at 10:51 AM

## 2021-05-18 NOTE — OP NOTE
incision and the deeper aspects of each wound for total of 70 cc. Each cavity was irrigated with sterile water until the effluent returned clear. Each incision was closed by approximating the dermis with interrupted sutures of 3-0 Monocryl followed by the skin with a running subcuticular stitch of 4-0 Monocryl. Dermabond was applied followed by a sterile dressing and an Ace wrap. At this point the patient was returned to the recovery room in good condition. Sponge and needle counts are correct.   Estimated blood loss is minimal.

## 2021-05-18 NOTE — ANESTHESIA PRE PROCEDURE
Department of Anesthesiology  Preprocedure Note       Name:  Laura Marin   Age:  [de-identified] y.o.  :  1940                                          MRN:  [id number]         Date:  2021      Surgeon: Jason Shi):  Bita Samson MD    Procedure: Procedure(s):  LEFT BREAST LUMPECTOMY  WITH WIRE LOCALIZATION @ 11 AND LEFT  SENTINEL NODE BIOPSY @12    Medications prior to admission:   Prior to Admission medications    Medication Sig Start Date End Date Taking?  Authorizing Provider   loratadine (CLARITIN) 10 MG tablet Take 10 mg by mouth daily as needed   Yes Historical Provider, MD   Ascorbic Acid (SUPER C COMPLEX PO) Take 1 tablet by mouth daily   Yes Historical Provider, MD   fluticasone-salmeterol (ADVAIR) 250-50 MCG/DOSE AEPB Inhale 1 puff into the lungs daily   Yes Historical Provider, MD   vitamin D (CHOLECALCIFEROL) 1000 UNIT TABS tablet Take 1,000 Units by mouth daily   Yes Historical Provider, MD   hydrochlorothiazide (HYDRODIURIL) 25 MG tablet Take 25 mg by mouth daily   Yes Historical Provider, MD   atenolol (TENORMIN) 50 MG tablet Take 50 mg by mouth daily   Yes Historical Provider, MD   levothyroxine (SYNTHROID) 150 MCG tablet Take 150 mcg by mouth Daily   Yes Historical Provider, MD   aspirin 81 MG EC tablet Take 81 mg by mouth daily   Yes Historical Provider, MD   NIFEdipine (ADALAT CC) 30 MG extended release tablet Take 30 mg by mouth daily   Yes Historical Provider, MD       Current medications:    Current Facility-Administered Medications   Medication Dose Route Frequency Provider Last Rate Last Admin    lactated ringers infusion   Intravenous Continuous Stephanie Montelongo  mL/hr at 21 1046 New Bag at 21 1046    sodium chloride flush 0.9 % injection 10 mL  10 mL Intravenous 2 times per day Stephanie Montelongo MD        sodium chloride flush 0.9 % injection 10 mL  10 mL Intravenous PRN Stephanie Montelongo MD        0.9 % sodium chloride infusion  25 mL Intravenous PRN Dorette Motley, MD Armida Brunner lidocaine PF 1 % injection 1 mL  1 mL Intradermal Once PRN Hilda Steele MD        ceFAZolin (ANCEF) 2000 mg in dextrose 5 % 50 mL IVPB  2,000 mg Intravenous Once Anika Angela MD        magnesium sulfate 2000 mg in 50 mL IVPB premix  2,000 mg Intravenous Once Anika Angela MD         Facility-Administered Medications Ordered in Other Encounters   Medication Dose Route Frequency Provider Last Rate Last Admin    technetium Tc 99m tilmanocept (LYMPHOSEEK) injection 0.6 millicurie  905 micro curie Subcutaneous ONCE PRN Anika Angela MD   0.6 millicurie at 15/68/75 4474       Allergies:     Allergies   Allergen Reactions    Sulfa Antibiotics Dermatitis     Itching, yennifer       Problem List:    Patient Active Problem List   Diagnosis Code    Adiposity E66.9    Asthma J45.909    Atopic rhinitis J30.9    Benign essential hypertension I10    Carpal tunnel syndrome on both sides G56.03    Difficulty producing voiced sounds R47.89    Hidradenitis axillaris L73.2    Hypercalcemia E83.52    Hyperglycemia R73.9    Hyperlipidemia E78.5    Hyperparathyroidism (HCC) E21.3    Lobular carcinoma of left breast (HCC) C50.912    Postoperative hypothyroidism E89.0    Sarcoidosis D86.9       Past Medical History:        Diagnosis Date    Asthma     Hypertension     Hypothyroid     thyroidectomy    Invasive lobular carcinoma of left breast in female ([facility] [address] 09/04/2020       Past Surgical History:        Procedure Laterality Date    APPENDECTOMY      BREAST LUMPECTOMY Left     COLONOSCOPY  01/20/2012        HYSTERECTOMY, TOTAL ABDOMINAL      one ovary preserved    US BREAST NEEDLE BIOPSY LEFT Left 09/04/2020    Left 3-4:00 position: Invasive lobular carcinoma, ES+/NV+    US BREAST NEEDLE BIOPSY LEFT  9/4/2020    US BREAST NEEDLE BIOPSY LEFT       Social History:    Social History     Tobacco Use    Smoking status: Former Smoker     Types: Cigarettes    Smokeless tobacco: Never Used   Substance Use Topics    Alcohol use: Yes     Comment: rarely                                Counseling given: Not Answered      Vital Signs (Current):   Vitals:    05/18/21 1022 05/18/21 1030   BP: (!) 162/82    Pulse: 82    Resp: 18    Temp: 97.5 °F (36.4 °C)    TempSrc: Temporal    SpO2: 92%    Weight:  209 lb (94.8 kg)   Height:  5' 8\" (1.727 m)                                              BP Readings from Last 3 Encounters:   05/18/21 (!) 162/82   05/10/21 (!) 178/83   03/10/21 132/80       NPO Status: Time of last liquid consumption: 2300                        Time of last solid consumption: 2300                        Date of last liquid consumption: 05/17/21                        Date of last solid food consumption: 05/17/21    BMI:   Wt Readings from Last 3 Encounters:   05/18/21 209 lb (94.8 kg)   05/10/21 214 lb 14.4 oz (97.5 kg)   03/10/21 214 lb (97.1 kg)     Body mass index is 31.78 kg/m².     CBC:   Lab Results   Component Value Date    WBC 8.6 05/10/2021    RBC 5.29 05/10/2021    HGB 15.8 05/10/2021    HCT 47.6 05/10/2021    MCV 90.0 05/10/2021    RDW 11.9 05/10/2021     05/10/2021       CMP:   Lab Results   Component Value Date     05/10/2021    K 3.6 05/10/2021     05/10/2021    CO2 22 05/10/2021    BUN 13 05/10/2021    CREATININE 0.84 05/10/2021    GFRAA >60 05/10/2021    LABGLOM >60 05/10/2021    GLUCOSE 191 05/10/2021    PROT 7.5 05/10/2021    CALCIUM 9.6 05/10/2021    BILITOT 0.33 05/10/2021    ALKPHOS 113 05/10/2021    AST 12 05/10/2021    ALT 11 05/10/2021       POC Tests:   Recent Labs     05/18/21  1038   POCGLU 150*       Coags: No results found for: PROTIME, INR, APTT    HCG (If Applicable): No results found for: PREGTESTUR, PREGSERUM, HCG, HCGQUANT     ABGs: No results found for: PHART, PO2ART, OLZ6OKJ, MDP3LPO, BEART, D0RLXEGC     Type & Screen (If Applicable):  No results found for: LABABO, LABRH    Drug/Infectious Status (If Applicable):  No results found for: HIV, HEPCAB COVID-19 Screening (If Applicable):   Lab Results   Component Value Date    COVID19 Not Detected 05/14/2021           Anesthesia Evaluation  Patient summary reviewed and Nursing notes reviewed  Airway: Mallampati: III  TM distance: >3 FB   Neck ROM: full  Mouth opening: > = 3 FB Dental:    (+) upper dentures and lower dentures      Pulmonary:   (+) asthma: exercise-induced asthma, allergic asthma,                           ROS comment: Sarcoidosis    Paralyzed vocal cord s/p total thyroidectomy   Cardiovascular:    (+) hypertension:,                   Neuro/Psych:               GI/Hepatic/Renal: Neg GI/Hepatic/Renal ROS            Endo/Other:    (+) hypothyroidism::., .                  ROS comment: S/P thyroidectomy Abdominal:           Vascular:                                        Anesthesia Plan      general     ASA 3       Induction: intravenous. Anesthetic plan and risks discussed with patient.                       Ranjeet Singh MD   5/18/2021

## 2021-05-21 LAB — SURGICAL PATHOLOGY REPORT: NORMAL

## 2021-05-27 ENCOUNTER — TELEPHONE (OUTPATIENT)
Dept: ONCOLOGY | Age: 81
End: 2021-05-27

## 2021-05-27 NOTE — TELEPHONE ENCOUNTER
Called and spoke with Ciara Ospina today. I explained that I am her nurse navigator at [facility] ([facility]) and I work with Dr. Teressa Villa and I follow breast cancer patients. I am here as a resource, support, her to help coordinate care and remove barriers to care. She relates she saw Dr. Teressa Villa yesterday and that Dr. Teressa Villa was referring her to med onc and rad onc. I let her know that I would like to help her get those scheduled. I let her know we can schedule them one right after another on the same day. Spoke with schedulers and patient. Patient wants to wait until after 6/10/21 for consults because she has other appointments. I offered her 6/11/21 to see both med onc, rad onc and genetics. She is agreeable. I mailed out navigation and appointment information to patient with doctors names, date, time, location and directions. Encouraged Ciara Ospina to call me as needed with questions or concerns. Let her know that I am here to help her. Pt on pending.

## 2021-06-11 ENCOUNTER — TELEPHONE (OUTPATIENT)
Dept: ONCOLOGY | Age: 81
End: 2021-06-11

## 2021-06-11 ENCOUNTER — INITIAL CONSULT (OUTPATIENT)
Dept: ONCOLOGY | Age: 81
End: 2021-06-11
Payer: MEDICARE

## 2021-06-11 ENCOUNTER — HOSPITAL ENCOUNTER (OUTPATIENT)
Dept: RADIATION ONCOLOGY | Age: 81
Discharge: HOME | End: 2021-06-11
Payer: MEDICARE

## 2021-06-11 ENCOUNTER — CLINICAL DOCUMENTATION (OUTPATIENT)
Dept: PHYSICAL THERAPY | Facility: CLINIC | Age: 81
End: 2021-06-11

## 2021-06-11 VITALS
TEMPERATURE: 97 F | HEART RATE: 82 BPM | DIASTOLIC BLOOD PRESSURE: 88 MMHG | SYSTOLIC BLOOD PRESSURE: 164 MMHG | RESPIRATION RATE: 16 BRPM

## 2021-06-11 VITALS
BODY MASS INDEX: 32.55 KG/M2 | HEIGHT: 68 IN | WEIGHT: 214.8 LBS | SYSTOLIC BLOOD PRESSURE: 164 MMHG | DIASTOLIC BLOOD PRESSURE: 88 MMHG | TEMPERATURE: 97 F | HEART RATE: 82 BPM | RESPIRATION RATE: 16 BRPM

## 2021-06-11 DIAGNOSIS — C50.912 LOBULAR CARCINOMA OF LEFT BREAST (HCC): Primary | ICD-10-CM

## 2021-06-11 DIAGNOSIS — C50.512 MALIGNANT NEOPLASM OF LOWER-OUTER QUADRANT OF LEFT BREAST OF FEMALE, ESTROGEN RECEPTOR POSITIVE (HCC): ICD-10-CM

## 2021-06-11 DIAGNOSIS — Z17.0 MALIGNANT NEOPLASM OF LOWER-OUTER QUADRANT OF LEFT BREAST OF FEMALE, ESTROGEN RECEPTOR POSITIVE (HCC): ICD-10-CM

## 2021-06-11 PROCEDURE — 1090F PRES/ABSN URINE INCON ASSESS: CPT | Performed by: INTERNAL MEDICINE

## 2021-06-11 PROCEDURE — 99211 OFF/OP EST MAY X REQ PHY/QHP: CPT | Performed by: STUDENT IN AN ORGANIZED HEALTH CARE EDUCATION/TRAINING PROGRAM

## 2021-06-11 PROCEDURE — 99204 OFFICE O/P NEW MOD 45 MIN: CPT | Performed by: STUDENT IN AN ORGANIZED HEALTH CARE EDUCATION/TRAINING PROGRAM

## 2021-06-11 PROCEDURE — G8417 CALC BMI ABV UP PARAM F/U: HCPCS | Performed by: INTERNAL MEDICINE

## 2021-06-11 PROCEDURE — 99202 OFFICE O/P NEW SF 15 MIN: CPT | Performed by: INTERNAL MEDICINE

## 2021-06-11 PROCEDURE — 99999 PR OFFICE/OUTPT VISIT,PROCEDURE ONLY: CPT | Performed by: GENETIC COUNSELOR, MS

## 2021-06-11 PROCEDURE — G8427 DOCREV CUR MEDS BY ELIG CLIN: HCPCS | Performed by: INTERNAL MEDICINE

## 2021-06-11 PROCEDURE — 99205 OFFICE O/P NEW HI 60 MIN: CPT | Performed by: INTERNAL MEDICINE

## 2021-06-11 RX ORDER — UMECLIDINIUM BROMIDE AND VILANTEROL TRIFENATATE 62.5; 25 UG/1; UG/1
1 POWDER RESPIRATORY (INHALATION) DAILY
COMMUNITY

## 2021-06-11 RX ORDER — LETROZOLE 2.5 MG/1
2.5 TABLET, FILM COATED ORAL DAILY
Qty: 30 TABLET | Refills: 3 | Status: SHIPPED | OUTPATIENT
Start: 2021-06-11 | End: 2022-02-21

## 2021-06-11 NOTE — PROGRESS NOTES
_   Ms. Jude Cates is a very pleasant [de-identified] y.o. female with history of multiple comorbidities as listed. The patient had bilateral screening mammogram 2020. She was noted to have suspicious mass in the left breast.  Diagnostic mammogram and ultrasound and biopsy done 2020. Patient was noted to have suspicious 1 cm mass in the left breast lower outer quadrant. Biopsy showed invasive lobular carcinoma. ER positive, IL positive, HER-2/janiya not amplified. Patient was evaluated by her surgeon but she did not want to have the surgery due to the corona. She was started on endocrine therapy with Arimidex. Patient started treatment in 2021 due to increasing side effects. Subsequently she was evaluated again by her surgeon and she had lumpectomy 2021. Final pathology results showed 2.4 cm invasive lobular carcinoma with clear margins. [place] lymph node biopsy was positive for sarcoidosis. The patient is seen today for further management of her breast cancer. The patient did very well after her lumpectomy without any complication. She had fluid collections. The patient had no symptom preceding her diagnosis of cancer. No chest pain, shortness of breath, cough, sputum or hemoptysis, no back pain or kylie aches, no weight loss or decreased appetite, no fever or night sweating. She denies feeling any lumps or bumps or enlarged lymph nodes. No headaches, and no other complaints. GYNECOLOGICAL HISTORY  Menarche at age 14. Menaupause at age 51 hysterectomy. +0. Age at first full term pregnancy 25. No use of HRT. PAST MEDICAL HISTORY: has a past medical history of Asthma, Hypertension, Hypothyroid, and Invasive lobular carcinoma of left breast in female [facility]). PAST SURGICAL HISTORY: has a past surgical history that includes Colonoscopy (2012); Breast lumpectomy (Left);  Hysterectomy, total abdominal; US BREAST BIOPSY W LOC DEVICE 1ST LESION LEFT (Left, 09/04/2020); US BREAST BIOPSY W LOC DEVICE 1ST LESION LEFT (9/4/2020); Appendectomy; US PLACE BREAST LOC DEVICE 1ST LESION LEFT (Left, 5/18/2021); Breast lumpectomy (Left, 5/18/2021); and Thyroidectomy (2015). CURRENT MEDICATIONS:  has a current medication list which includes the following prescription(s): anoro ellipta, loratadine, ascorbic acid, hydrochlorothiazide, atenolol, levothyroxine, aspirin, nifedipine, and vitamin d. ALLERGIES:  is allergic to sulfa antibiotics. FAMILY HISTORY: sister breast cancer 45s, otherwise negative for any hematological or oncological conditions. SOCIAL HISTORY:  reports that she has quit smoking. Her smoking use included cigarettes. She has never used smokeless tobacco. She reports current alcohol use. She reports that she does not use drugs. REVIEW OF SYSTEMS:     · General: No weakness or fatigue. No unanticipated weight loss or decreased appetite. No fever or chills. · Eyes: No blurred vision, eye pain or double vision. · Ears: No hearing problems or drainage. No tinnitus. · Throat: No sore throat, problems with swallowing or dysphagia. · Respiratory: No cough, sputum or hemoptysis. No shortness of breath. No pleuritic chest pain. · Cardiovascular: No chest pain, orthopnea or PND. No lower extremity edema. No palpitation. · Gastrointestinal: No problems with swallowing. No abdominal pain or bloating. No nausea or vomiting. No diarrhea or constipation. No GI bleeding. · Genitourinary: No dysuria, hematuria, frequency or urgency. · Musculoskeletal: No muscle aches or pains. No limitation of movement. No back pain. No gait disturbance, No joint complaints. · Dermatologic: No skin rashes or pruritus. No skin lesions or discolorations. · Psychiatric: No depression, anxiety, or stress or signs of schizophrenia. No change in mood or affect. · Hematologic: No history of bleeding tendency. No bruises or ecchymosis.  No history of clotting problems. · Infectious disease: No fever, chills or frequent infections. · Endocrine: No polydipsia or polyuria. No temperature intolerance. · Neurologic: No headaches or dizziness. No weakness or numbness of the extremities. No changes in balance, coordination,  memory, mentation, behavior. · Allergic/Immunologic: No nasal congestion or hives. No repeated infections. PHYSICAL EXAM:  The patient is not in acute distress. Vital signs: Blood pressure (!) 164/88, pulse 82, temperature 97 °F (36.1 °C), temperature source Temporal, resp. rate 16, height 5' 8\" (1.727 m), weight 214 lb 12.8 oz (97.4 kg), not currently breastfeeding. HEENT:  Eyes are normal. Ears, nose and throat are normal.  Neck: Supple. No lymph node enlargement. No thyroid enlargement. Trachea is centrally located. Chest:  Clear to auscultation. No wheezes or crepitations. Breast: Status post lumpectomy and sentinel lymph node biopsy. Left breast seroma and axillary seroma  No masses. No skin or nippple abnormalities. No palpable lymph nodes. Heart: Regular sinus rhythm. Abdomen: Soft, nontender. No hepatosplenomegaly. No masses. Extremities:  With no edema. Lymph Nodes:  No cervical, axillary or inguinal lymph node enlargement. Neurologic:  Conscious and oriented. No focal neurological deficits. Psychosocial: No depression, anxiety or stress. Skin: No rashes, bruises or ecchymoses.       Review of Diagnostic data:   Lab Results   Component Value Date    WBC 8.6 05/10/2021    HGB 15.8 (H) 05/10/2021    HCT 47.6 (H) 05/10/2021    MCV 90.0 05/10/2021     05/10/2021       Chemistry        Component Value Date/Time     05/10/2021 1328    K 3.6 (L) 05/10/2021 1328     05/10/2021 1328    CO2 22 05/10/2021 1328    BUN 13 05/10/2021 1328    CREATININE 0.84 05/10/2021 1328        Component Value Date/Time    CALCIUM 9.6 05/10/2021 1328    ALKPHOS 113 (H) 05/10/2021 1328    AST 12 05/10/2021 1328    ALT 11 05/10/2021 1328    BILITOT 0.33 05/10/2021 1328          [facility] BREAST SPECIMEN  Narrative: EXAMINATION:  SPECIMEN RADIOGRAPH 5/18/2021    TECHNIQUE:  Single radiograph of the surgical specimen was obtained intraoperatively. VIEWS: Single specimen radiograph obtained on a grid board. COMPARISON:  Pre-lumpectomy left breast mammogram of earlier the same day. HISTORY:  Specimen radiograph post lumpectomy. ORDERING SYSTEM PROVIDED HISTORY: Status  post breast lumpectomy  TECHNOLOGIST PROVIDED HISTORY:  breast cancer    FINDINGS:  Specimen radiograph is submitted intraoperatively. Biopsy clip is in the specimen. Biopsy clip coordinates are D4. Wire tip is intact. Impression: Specimen is adequate containing the irregular-shaped nodule, wire hook and  clip. Findings called to the operating room at 14:52 on 5/18/2021    RECOMMENDATIONS:  ZW pending histology. [facility] DIGITAL DIAGNOSTIC W OR WO CAD LEFT  Narrative: EXAMINATION:  ULTRASONOGRAPHIC GUIDED NEEDLE LOCALIZATION OF THE LEFT BREAST    POSTPROCEDURE LEFT MAMMOGRAM    5/18/2021 10:53 am    TECHNIQUE:  Following informed written consent, the patient was brought to the  ultrasonographic suite. Using real-time ultrasonography, appropriate approach  for wire localization of the patient's suspicious solid nodule at 3 o'clock  was selected and marked on the skin surface. The time-out policy was  followed. The left breast was prepped and draped. Using maximal sterile technique,  approach site was infiltrated with 2% xylocaine. Next, 9 cm Ghiatis breast  localization needle with inner wire was advanced to the lesion. Once  positioning of the needle tip within the lesion was ascertained, the needle  was withdrawn, deposit in the hook of the wire within the nodule. Biplanar mammography of the left breast was performed in a separate room to  ascertain wire placement. Hard copy films were marked and sent with the  patient to the operating room.   The patient tolerated the procedure well. Sterile dressing was placed over the wire exit point. COMPARISON:  22 April 2021    HISTORY:  ORDERING SYSTEM PROVIDED HISTORY: left breast mass  TECHNOLOGIST PROVIDED HISTORY:  left breast mass    FINDINGS:  The postprocedure left mammogram confirmed good needle/wire localization of  the breast the hook of the localization wire is within the nodule adjacent to  the clip. Impression: Needle/wire localization performed for lumpectomy. Satisfactory wire  placement. RECOMMENDATIONS:  ZW pending pathology  US PLACE BREAST LOC DEVICE 1ST LESION LEFT  Narrative: EXAMINATION:  ULTRASONOGRAPHIC GUIDED NEEDLE LOCALIZATION OF THE LEFT BREAST    POSTPROCEDURE LEFT MAMMOGRAM    5/18/2021 10:53 am    TECHNIQUE:  Following informed written consent, the patient was brought to the  ultrasonographic suite. Using real-time ultrasonography, appropriate approach  for wire localization of the patient's suspicious solid nodule at 3 o'clock  was selected and marked on the skin surface. The time-out policy was  followed. The left breast was prepped and draped. Using maximal sterile technique,  approach site was infiltrated with 2% xylocaine. Next, 9 cm Ghiatis breast  localization needle with inner wire was advanced to the lesion. Once  positioning of the needle tip within the lesion was ascertained, the needle  was withdrawn, deposit in the hook of the wire within the nodule. Biplanar mammography of the left breast was performed in a separate room to  ascertain wire placement. Hard copy films were marked and sent with the  patient to the operating room. The patient tolerated the procedure well. Sterile dressing was placed over the wire exit point.     COMPARISON:  22 April 2021    HISTORY:  ORDERING SYSTEM PROVIDED HISTORY: left breast mass  TECHNOLOGIST PROVIDED HISTORY:  left breast mass    FINDINGS:  The postprocedure left mammogram confirmed good needle/wire localization of  the breast the hook of the localization wire is within the nodule adjacent to  the clip. Impression: Needle/wire localization performed for lumpectomy. Satisfactory wire  placement. RECOMMENDATIONS:  ZW pending pathology  NM LYMPHOSCINTIGRAM  Narrative: EXAMINATION:  LYMPHOSCINTIGRAPHY 05/18/2021    TECHNIQUE:  Topical anesthetic was applied to the left breast around the nipple with  barrier, for approximately 60 minutes prior to the patient arriving in the  nuclear medicine suite. The left breast was prepped and draped. Time-out  policy was adhered tube. Informed written consent was obtained. Using  maximum aseptic technique, 600 microcuries of technetium 99 M [place] was  injected in 4 divided aliquots intradermally at positions approximating 12,  3, 6 and 9 o'clock around the left nipple. The left breast was massaged to  enhance uptake of the radiotracer. Gamma camera imaging was obtained. Approximately 1155 hours    HISTORY:  Left breast cancer. FINDINGS:  There is no appreciable axillary uptake diagnostic of a sentinel node. Impression: No appreciable uptake outside of the injection sites on scintigraphy  diagnostic of a sentinel node. Lumpectomy of the left breast 5/18/2021:  1.  LEFT BREAST, EXCISIONAL LUMPECTOMY WITH WIRE LOCALIZATION:-   INVASIVE LOBULAR CARCINOMA, 2.4 CM.- THE FINAL SURGICAL MARGINS ARE   NEGATIVE FOR INVASIVE CARCINOMA. - SEE COMMENT. 2.  LEFT BREAST, ADDITIONAL (FINAL) POSTERIOR MARGIN, EXCISION:- THE   FINAL POSTERIOR MARGIN IS NEGATIVE FOR CARCINOMA (7 MM FROM THE    INVASIVE LOBULAR CARCINOMA). 3.  LEFT BREAST, ADDITIONAL (FINAL) INFERIOR MARGIN, EXCISION:-   NEGATIVE FOR NEOPLASIA. 4.  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      IMPRESSION:   Stage T2 a N0 M0 left breast lower outer quadrant invasive lobular carcinoma, ER positive, KS positive, HER-2/janiya not amplified. Sarcoidosis  Multiple comorbidities as listed    PLAN: For more than 60 minutes of face to face discussion, I explained to the patient and her companions the nature of breast cancer, staging, prognosis and treatment. The pathology results were reviewed and explained in layman language. Obviously patient had invasive lobular carcinoma with positive hormone receptors and negative HER-2/janiya. She received endocrine therapy with Arimidex before her surgery. Patient did not have any significant response to the treatment but this could be also related to short duration of treatment with anastrozole since she decided to stop with soon due to side effects. I discussed options of treatment in the adjuvant setting. Patient is clear about her wishes of not to have any chemotherapy treatment. So I do not see a need for Oncotype DX. I discussed endocrine therapy with the patient. She is reluctant to take it due to her bad experience with the side effects related to Arimidex. After lengthy discussion she agreed on trying a different aromatase inhibitor so we will prescribe Femara. Benefits and side effects were explained. We will monitor bone density and she will take vitamin D and calcium. The patient will be seen by radiation oncology today and if she agrees to have radiation therapy, she will then start Femara shortly after completing radiation. Patient's questions were answered to the best of her satisfaction and she verbalized full understanding and agreement.

## 2021-06-11 NOTE — LETTER
_    Jolanta Senior MD    2021     Supriya Thomas MD   [address] [facility]    Dear Dr Chambers Dixie: Thank you for referring Deisi Martin, 1940, to me for evaluation. Below are the relevant portions of my assessment and plan of care. Ms. Deisi Martin is a very pleasant [de-identified] y.o. female with history of multiple comorbidities as listed. The patient had bilateral screening mammogram 2020. She was noted to have suspicious mass in the left breast.  Diagnostic mammogram and ultrasound and biopsy done 2020. Patient was noted to have suspicious 1 cm mass in the left breast lower outer quadrant. Biopsy showed invasive lobular carcinoma. ER positive, MO positive, HER-2/janiya not amplified. Patient was evaluated by her surgeon but she did not want to have the surgery due to the corona. She was started on endocrine therapy with Arimidex. Patient started treatment in 2021 due to increasing side effects. Subsequently she was evaluated again by her surgeon and she had lumpectomy 2021. Final pathology results showed 2.4 cm invasive lobular carcinoma with clear margins. [place] lymph node biopsy was positive for sarcoidosis. The patient is seen today for further management of her breast cancer. The patient did very well after her lumpectomy without any complication. She had fluid collections. The patient had no symptom preceding her diagnosis of cancer. No chest pain, shortness of breath, cough, sputum or hemoptysis, no back pain or kylie aches, no weight loss or decreased appetite, no fever or night sweating. She denies feeling any lumps or bumps or enlarged lymph nodes. No headaches, and no other complaints. GYNECOLOGICAL HISTORY  Menarche at age 14. Menaupause at age 51 hysterectomy. +0. Age at first full term pregnancy 25. No use of HRT.     PAST MEDICAL HISTORY: has a past medical history of Asthma, Hypertension, Hypothyroid, and Invasive lobular carcinoma of left breast in female [facility]). PAST SURGICAL HISTORY: has a past surgical history that includes Colonoscopy (01/20/2012); Breast lumpectomy (Left); Hysterectomy, total abdominal; US BREAST BIOPSY W LOC DEVICE 1ST LESION LEFT (Left, 09/04/2020); US BREAST BIOPSY W LOC DEVICE 1ST LESION LEFT (9/4/2020); Appendectomy; US PLACE BREAST LOC DEVICE 1ST LESION LEFT (Left, 5/18/2021); Breast lumpectomy (Left, 5/18/2021); and Thyroidectomy (2015). CURRENT MEDICATIONS:  has a current medication list which includes the following prescription(s): anoro ellipta, loratadine, ascorbic acid, hydrochlorothiazide, atenolol, levothyroxine, aspirin, nifedipine, and vitamin d. ALLERGIES:  is allergic to sulfa antibiotics. FAMILY HISTORY: sister breast cancer 45s, otherwise negative for any hematological or oncological conditions. SOCIAL HISTORY:  reports that she has quit smoking. Her smoking use included cigarettes. She has never used smokeless tobacco. She reports current alcohol use. She reports that she does not use drugs. REVIEW OF SYSTEMS:     · General: No weakness or fatigue. No unanticipated weight loss or decreased appetite. No fever or chills. · Eyes: No blurred vision, eye pain or double vision. · Ears: No hearing problems or drainage. No tinnitus. · Throat: No sore throat, problems with swallowing or dysphagia. · Respiratory: No cough, sputum or hemoptysis. No shortness of breath. No pleuritic chest pain. · Cardiovascular: No chest pain, orthopnea or PND. No lower extremity edema. No palpitation. · Gastrointestinal: No problems with swallowing. No abdominal pain or bloating. No nausea or vomiting. No diarrhea or constipation. No GI bleeding. · Genitourinary: No dysuria, hematuria, frequency or urgency. · Musculoskeletal: No muscle aches or pains. No limitation of movement. No back pain. No gait disturbance, No joint complaints.   · Dermatologic: No skin rashes or pruritus. No skin lesions or discolorations. · Psychiatric: No depression, anxiety, or stress or signs of schizophrenia. No change in mood or affect. · Hematologic: No history of bleeding tendency. No bruises or ecchymosis. No history of clotting problems. · Infectious disease: No fever, chills or frequent infections. · Endocrine: No polydipsia or polyuria. No temperature intolerance. · Neurologic: No headaches or dizziness. No weakness or numbness of the extremities. No changes in balance, coordination,  memory, mentation, behavior. · Allergic/Immunologic: No nasal congestion or hives. No repeated infections. PHYSICAL EXAM:  The patient is not in acute distress. Vital signs: Blood pressure (!) 164/88, pulse 82, temperature 97 °F (36.1 °C), temperature source Temporal, resp. rate 16, height 5' 8\" (1.727 m), weight 214 lb 12.8 oz (97.4 kg), not currently breastfeeding. HEENT:  Eyes are normal. Ears, nose and throat are normal.  Neck: Supple. No lymph node enlargement. No thyroid enlargement. Trachea is centrally located. Chest:  Clear to auscultation. No wheezes or crepitations. Breast: Status post lumpectomy and sentinel lymph node biopsy. Left breast seroma and axillary seroma  No masses. No skin or nippple abnormalities. No palpable lymph nodes. Heart: Regular sinus rhythm. Abdomen: Soft, nontender. No hepatosplenomegaly. No masses. Extremities:  With no edema. Lymph Nodes:  No cervical, axillary or inguinal lymph node enlargement. Neurologic:  Conscious and oriented. No focal neurological deficits. Psychosocial: No depression, anxiety or stress. Skin: No rashes, bruises or ecchymoses.       Review of Diagnostic data:   Lab Results   Component Value Date    WBC 8.6 05/10/2021    HGB 15.8 (H) 05/10/2021    HCT 47.6 (H) 05/10/2021    MCV 90.0 05/10/2021     05/10/2021       Chemistry        Component Value Date/Time     05/10/2021 1328    K 3.6 (L) 05/10/2021 1328     05/10/2021 1328    CO2 22 05/10/2021 1328    BUN 13 05/10/2021 1328    CREATININE 0.84 05/10/2021 1328        Component Value Date/Time    CALCIUM 9.6 05/10/2021 1328    ALKPHOS 113 (H) 05/10/2021 1328    AST 12 05/10/2021 1328    ALT 11 05/10/2021 1328    BILITOT 0.33 05/10/2021 1328          [facility] BREAST SPECIMEN  Narrative: EXAMINATION:  SPECIMEN RADIOGRAPH 5/18/2021    TECHNIQUE:  Single radiograph of the surgical specimen was obtained intraoperatively. VIEWS: Single specimen radiograph obtained on a grid board. COMPARISON:  Pre-lumpectomy left breast mammogram of earlier the same day. HISTORY:  Specimen radiograph post lumpectomy. ORDERING SYSTEM PROVIDED HISTORY: Status  post breast lumpectomy  TECHNOLOGIST PROVIDED HISTORY:  breast cancer    FINDINGS:  Specimen radiograph is submitted intraoperatively. Biopsy clip is in the specimen. Biopsy clip coordinates are D4. Wire tip is intact. Impression: Specimen is adequate containing the irregular-shaped nodule, wire hook and  clip. Findings called to the operating room at 14:52 on 5/18/2021    RECOMMENDATIONS:  ZW pending histology. MG DIGITAL DIAGNOSTIC W OR WO CAD LEFT  Narrative: EXAMINATION:  ULTRASONOGRAPHIC GUIDED NEEDLE LOCALIZATION OF THE LEFT BREAST    POSTPROCEDURE LEFT MAMMOGRAM    5/18/2021 10:53 am    TECHNIQUE:  Following informed written consent, the patient was brought to the  ultrasonographic suite. Using real-time ultrasonography, appropriate approach  for wire localization of the patient's suspicious solid nodule at 3 o'clock  was selected and marked on the skin surface. The time-out policy was  followed. The left breast was prepped and draped. Using maximal sterile technique,  approach site was infiltrated with 2% xylocaine. Next, 9 cm Ghiatis breast  localization needle with inner wire was advanced to the lesion.   Once  positioning of the needle tip within the lesion was ascertained, the needle  was withdrawn, deposit in the hook of the wire within the nodule. Biplanar mammography of the left breast was performed in a separate room to  ascertain wire placement. Hard copy films were marked and sent with the  patient to the operating room. The patient tolerated the procedure well. Sterile dressing was placed over the wire exit point. COMPARISON:  22 April 2021    HISTORY:  ORDERING SYSTEM PROVIDED HISTORY: left breast mass  TECHNOLOGIST PROVIDED HISTORY:  left breast mass    FINDINGS:  The postprocedure left mammogram confirmed good needle/wire localization of  the breast the hook of the localization wire is within the nodule adjacent to  the clip. Impression: Needle/wire localization performed for lumpectomy. Satisfactory wire  placement. RECOMMENDATIONS:  ZW pending pathology  US PLACE BREAST LOC DEVICE 1ST LESION LEFT  Narrative: EXAMINATION:  ULTRASONOGRAPHIC GUIDED NEEDLE LOCALIZATION OF THE LEFT BREAST    POSTPROCEDURE LEFT MAMMOGRAM    5/18/2021 10:53 am    TECHNIQUE:  Following informed written consent, the patient was brought to the  ultrasonographic suite. Using real-time ultrasonography, appropriate approach  for wire localization of the patient's suspicious solid nodule at 3 o'clock  was selected and marked on the skin surface. The time-out policy was  followed. The left breast was prepped and draped. Using maximal sterile technique,  approach site was infiltrated with 2% xylocaine. Next, 9 cm Ghiatis breast  localization needle with inner wire was advanced to the lesion. Once  positioning of the needle tip within the lesion was ascertained, the needle  was withdrawn, deposit in the hook of the wire within the nodule. Biplanar mammography of the left breast was performed in a separate room to  ascertain wire placement. Hard copy films were marked and sent with the  patient to the operating room. The patient tolerated the procedure well.   Sterile dressing was placed over the wire exit point. COMPARISON:  22 April 2021    HISTORY:  ORDERING SYSTEM PROVIDED HISTORY: left breast mass  TECHNOLOGIST PROVIDED HISTORY:  left breast mass    FINDINGS:  The postprocedure left mammogram confirmed good needle/wire localization of  the breast the hook of the localization wire is within the nodule adjacent to  the clip. Impression: Needle/wire localization performed for lumpectomy. Satisfactory wire  placement. RECOMMENDATIONS:  ZW pending pathology  NM LYMPHOSCINTIGRAM  Narrative: EXAMINATION:  LYMPHOSCINTIGRAPHY 05/18/2021    TECHNIQUE:  Topical anesthetic was applied to the left breast around the nipple with  barrier, for approximately 60 minutes prior to the patient arriving in the  nuclear medicine suite. The left breast was prepped and draped. Time-out  policy was adhered tube. Informed written consent was obtained. Using  maximum aseptic technique, 600 microcuries of technetium 99 M [place] was  injected in 4 divided aliquots intradermally at positions approximating 12,  3, 6 and 9 o'clock around the left nipple. The left breast was massaged to  enhance uptake of the radiotracer. Gamma camera imaging was obtained. Approximately 1155 hours    HISTORY:  Left breast cancer. FINDINGS:  There is no appreciable axillary uptake diagnostic of a sentinel node. Impression: No appreciable uptake outside of the injection sites on scintigraphy  diagnostic of a sentinel node. Lumpectomy of the left breast 5/18/2021:  1.  LEFT BREAST, EXCISIONAL LUMPECTOMY WITH WIRE LOCALIZATION:-   INVASIVE LOBULAR CARCINOMA, 2.4 CM.- THE FINAL SURGICAL MARGINS ARE   NEGATIVE FOR INVASIVE CARCINOMA. - SEE COMMENT. 2.  LEFT BREAST, ADDITIONAL (FINAL) POSTERIOR MARGIN, EXCISION:- THE   FINAL POSTERIOR MARGIN IS NEGATIVE FOR CARCINOMA (7 MM FROM THE    INVASIVE LOBULAR CARCINOMA). 3.  LEFT BREAST, ADDITIONAL (FINAL) INFERIOR MARGIN, EXCISION:-   NEGATIVE FOR NEOPLASIA.    4.  LEFT AXILLARY SENTINEL you.    Sincerely,                            [facility]                            This note is created with the assistance of a speech recognition program.  While intending to generate a document that actually reflects the content of the visit, the document can still have some errors including those of syntax and sound a like substitutions which may escape proof reading. It such instances, actual meaning can be extrapolated by contextual diversion.

## 2021-06-11 NOTE — PROGRESS NOTES
History Narrative    Not on file     Social Determinants of Health     Financial Resource Strain:     Difficulty of Paying Living Expenses:    Food Insecurity:     Worried About Running Out of Food in the Last Year:     [address] in the Last Year:    Transportation Needs:     Lack of Transportation (Medical):  Lack of Transportation (Non-Medical):    Physical Activity:     Days of Exercise per Week:     Minutes of Exercise per Session:    Stress:     Feeling of Stress :    Social Connections:     Frequency of Communication with Friends and Family:     Frequency of Social Gatherings with Friends and Family:     Attends Gnosticist Services:     Active Member of Clubs or Organizations:     Attends Club or Organization Meetings:     Marital Status:    Intimate Partner Violence:     Fear of Current or Ex-Partner:     Emotionally Abused:     Physically Abused:     Sexually Abused:              FALLS RISK SCREEN  Instructions:  Assess the patient and enter the appropriate indicators that are present for fall risk identification. Total the numbers entered and assign a fall risk score from Table 2.  Reassess patient at a minimum every 12 weeks or with status change. Assessment   Date  6/11/2021     1. Mental Ability: confusion/cognitively impaired 0     2. Elimination Issues: incontinence, frequency 0       3. Ambulatory: use of assistive devices (walker, cane, off-loading devices),        attached to equipment (IV pole, oxygen) 0     4. Sensory Limitations: dizziness, vertigo, impaired vision 0     5. Age less than 65        0     6. Age 72 or greater 1     7. Medication: diuretics, strong analgesics, hypnotics, sedatives,        antihypertensive agents 3   8. Falls:  recent history of falls within the last 3 months (not to include slipping or        tripping) 0   TOTAL 4    If score of 4 or greater was education given?  Yes       TABLE 2   Risk Score Risk Level Plan of Care   0-3 Little or  No Risk 1. Provide assistance as indicated for ambulation activities  2. Reorient confused/cognitively impaired patient  3. Call-light/bell within patient's reach  4. Chair/bed in low position, stretcher/bed with siderails up except when performing patient care activities  5. Educate patient/family/caregiver on falls prevention  6.  Reassess in 12 weeks or with any noted change in patient condition which places them at a risk for a fall   4-6 Moderate Risk 1. Provide assistance as indicated for ambulation activities  2. Reorient confused/cognitively impaired patient  3. Call-light/bell within patient's reach  4. Chair/bed in low position, stretcher/bed with siderails up except when performing patient care activities  5. Educate patient/family/caregiver on falls prevention     7 or   Higher High Risk 1. Place patient in easily observable treatment room  2. Patient attended at all times by family member or staff  3. Provide assistance as indicated for ambulation activities  4. Reorient confused/cognitively impaired patient  5. Call-light/bell within patient's reach  6. Chair/bed in low position, stretcher/bed with siderails up except when performing patient care activities  7. Educate patient/family/caregiver on falls prevention             Assessment/Plan: Patient was seen today for breast clinic. Dr Meagan Lowe examined pt. Pt placed on pending list for plan of care.             Sil Meyers RN 6/11/2021 11:24 AM

## 2021-06-11 NOTE — CONSULTS
and posture are steady. PSYCHIATRIC:  Appropriate mood and affect for her clinical situation. ASSESSMENT AND PLAN:     I discussed with the patient regarding her diagnosis and explained the rationale for radiation therapy after breast conservation surgery. I described the logistics of radiation therapy from CT simulation through daily treatments. I also reviewed the potential acute side effects which include, but are not limited to, skin redness, peeling, itching, and ulceration; and late side effects which include, but are not limited to, skin thickening/fibrosis, lung scarring that is unlikely to affect breathing, risk for cardiac toxicity, and a small risk of secondary malignancy. While she is elderly, her tumor is a T2 lesion and she also had isolated tumor cells in the sentinel lymph node biopsy, giving her a higher risk of local recurrence. She would benefit from adjuvant radiation treatment of the left breast    However, the patient is currently being declining any adjuvant radiation treatment. She would like to pursue endocrine treatment. I explained that if she does not tolerate endocrine treatment, she should at least have adjuvant radiation treatment. I strongly advised her to reach out if for the reason she is not tolerating endocrine treatment or if she changes her mind regarding adjuvant radiation treatment to the left breast.    Thank you for the consultation and allowing me to participate in the care of this very pleasant patient. CC:    Kasia Betts MD    Patient Care Team:  Maria Luisa Poon MD as PCP - General  Karlos Yeung RN as Nurse Navigator (Oncology)       Total time spent on this case on the day of encounter is more than 60 minutes.  This time includes combination of one or more of the following - review of necessary tests, review of pertinent medical records from the EMR, performing medically appropriate examination and evaluation, counseling and educating the

## 2021-06-11 NOTE — TELEPHONE ENCOUNTER
I met with pt and family. Introduced myself as the breast cancer patient navigator and that I am here to help her to navigate the disease process, treatment, and follow up. I gave pt the [address]. Breast Cancer Folder includes [facility] ([facility]), [employer] and [employer]. Reviewed folder. Explained that I am here to assist her before, during and after her treatments are completed. Offered pt support and encouragement. Offered patient Baskets of Care Bag  Reviewed contents of bag. Patient was seen by Dr. Rosi Diaz, Dr. Steve Elliott  Treatment plan is: Pt has completed her surgical intervention, offered endocrine therapy, pt is agreeable to try Femara, offered adjuvant radiation therapy, pt is declining xrt at this time. Pt informed that if she changes her mind, or if femara is not tolerated and she decides to try radiation treatments she can call her nurse navigator. Patient does meets NCCN criteria for genetic testing. Pt decided to think about it and she will contact nurse navigator or [facility] if she would like to proceed with testing. Informed patient and family about what support services that we have available to our patients. Support service information sheet given to patient. This form includes support service providers name, description of job and contact numbers. Discussed Oncology rehab, explained that it can be helpful before-during and after treatment. We discussed that it can be valuable to meet with physical therapist to have lymphedema education and learn preventative measures. Explained lymphedema does not always occur and does not always occur right away. We want her to know what to look for and how to prevent it. Encouraged her to call if any concerns at anytime with heaviness, swelling in chest, arm, hand or fingers. We have a lymphedema specialist that she can see. Treatments include massage, wrapping, compression sleeve.   Encouraged to avoid blood draws, iv's, bp on surgical arm. Avoid tight clothing or jewelry or carrying heavy purse or bags with the affected arm. Maintenance compression sleeve may be beneficial when flying and when doing increased activity. Offered referral to onc rehab. Rekha Calles met with Víctor Dent from onc rehab. Referral made [facility]. Reviewed breast clinic recommendations with patient. Pt given copy of recommendations. Encouraged pt to call me with questions or concerns. Questions answered.  Pt placed on pending list.

## 2021-06-11 NOTE — PROGRESS NOTES
[facility]   Hereditary Cancer Risk Assessment      Name: Nilsa Moncada    YOB: 1940   Date of Consultation: 06/11/21    Ms. Az Appiah was seen at the [address] [place] [address] on 06/11/21. As part of her consultation, she was offered a hereditary cancer risk assessment. PERSONAL AND FAMILY HISTORY   Ms. Az Appiah is a [de-identified] y.o.  female who was recently diagnosed with breast cancer. Specifically, it is an invasive lobular carcinoma of the left breast.      She reports menarche at age 14, first live birth at age 25, and underwent menopause at age 51. She reports that there are no relatives with cancer but there is a question that her sister may have had breast cancer in her 45s. She relates that she is not certain if it was an actual malignancy or not. Ms. Az Appiah reports  ancestry and denies any Ashkenazi Voodoo heritage. RISK ASSESSMENT   We discussed that approximately 5-10% of cancers are due to a hereditary gene mutation which causes an increased risk for certain cancers. Hereditary cancers are typically diagnosed at younger ages (under age 46y) and occur in multiple generations of a family. Multiple individuals with the same type of cancer (example: breast) or uncommon cancers (example: ovarian, pancreatic, male breast cancer) are also features of hereditary cancers. We discussed that Ms. Ziegler's personal and family history are not highly concerning for a hereditary predisposition to cancer. This is due to her later age of onset and overall lack of family history. If her sister did have breast cancer in her 45s, then she would meet the NCCN guidelines for genetic testing. SUMMARY & PLAN  1) Genetic testing criteria would be based on Ms. Ziegler's family history and whether her sister had breast cancer in her 45s or not.  Nonetheless, Ms. Fausto Tiwari personal history is not highly

## 2021-06-11 NOTE — CARE COORDINATION
[facility] OUTPATIENT REHABILITATION  BREAST CANCER CLINIC SCREEN        Date:  2021  Patient: Sydney Shaver  : 1940  MRN: [id number]  Physician: Dr. Jv Adkins, Dr. Bo Lugo    Gender: F       Location Left breast   Type   Invasive mammary with lobular featrue, grade 1   Hormone Type ER/ IN +     HER2-janiya   HER  2 neg     Suspected Lymph Node involvement    Anticipated Chemo    Anticipated Radiation    Surgery date Already had lumpectomy Dr. Bo Lugo        Right Left   Shoulder Flexion  146   Shoulder Abduction  130   Shoulder ER ROM     Shoulder Strength Flex/Abd     Elbow Strength Flex/ext     Hand Dominance RIGHT        Measurements Upper Extremity  Measurements taken from the base of fingernail on D3 in cm. Fingers measured at base. Measurements in Centimeters  Right Left      Dorsum 11 21.1  20.4   Wrist 16 21.4  18.2   Lower Forearm   23 18.8  18.4   Upper Forearm   33  25.8  26   Olecranon  43  29.4  29.4   Lower Bicep   53  35  35.8   Upper Bicep/SHLD 69 40.2  39.9   Total 191.7  188. 1     Pt reporting some hardness and sensitivity left lateral breast by incision. Using a sock in bra to help with sensitivity    TEST    Posture    Forward head slight   kyphosis    scoliosis                        [x]  Lymphedema education sheet issued       [x]  [facility]      []  Additional Concerns/Red Flags   Previous neck or shoulder surgeries          Occupation, sports, interests             [x]  Preferred location     [x]  [facility] [address]  To see Selvin Hill in 2 weeks         Follow up phone call:________________________________________________________

## 2021-06-30 ENCOUNTER — TELEPHONE (OUTPATIENT)
Dept: ONCOLOGY | Age: 81
End: 2021-06-30

## 2021-06-30 NOTE — TELEPHONE ENCOUNTER
Called Shara Foley and left message. Reminded her that I am her oncology nurse navigator at [facility] ([facility]). Let her know that I wanted to check in with her, see how she is doing. I also let her know that I wanted to see if she was taking the femara pill that Dr. Meenakshi Amaral had prescribed daily. I like to call and make sure things are going ok. Reminded her that I am here as a resource, support, and to help facilitate care. Encouraged her to call me. Left my name and contact information. Let her know I am in office today and tomorrow, but I will be out of office a few days next week. I let her know I will reach out again in a few weeks if I do not hear from her.

## 2021-07-08 ENCOUNTER — HOSPITAL ENCOUNTER (OUTPATIENT)
Dept: PHYSICAL THERAPY | Facility: CLINIC | Age: 81
Setting detail: THERAPIES SERIES
Discharge: HOME | End: 2021-07-08
Payer: MEDICARE

## 2021-07-08 PROCEDURE — 97140 MANUAL THERAPY 1/> REGIONS: CPT

## 2021-07-08 PROCEDURE — 97161 PT EVAL LOW COMPLEX 20 MIN: CPT

## 2021-07-08 NOTE — CONSULTS
[] [address]  [address]    [phone]  F: [phone]   [x] [address]  [facility] [address]   [address]  P: [phone]  F: [phone]  [] [place]  [address]  P: [phone]  F: [phone]  [] [address]  [address] [address] [facility] [address]   [address]   [place]: [phone]  F: [phone]   [] [facility]) - [facility] & Therapy  [address]  [place]: [phone]   F: [phone]     PHYSICAL THERAPY EVALUATION - ONCOLOGY REHABILITATION    Date:  2021  Patient: Liseth Ha  : 1940  MRN: [id number]  Physician: Dr. Fabiana Velasquez: Lex Duncan ($40 copay, Brianna Eller after evaluation)  Medical Diagnosis:  Left breast CA   Rehab Codes: R07.89, l90.5, R60.0,  Onset GCJR:6- lumpectomy    Next Dr's appt.:     Subjective:   CC: Pan in left chest/axilla area. States she was told it could be drained from Dr. Nitza Mcgrath, she also thinks it could resolve on its own. Fatigue seems worsening with Femara, but not as bad as with Arimidex. Taking a nap when needed and needed then immediately. Was not napping prior.      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    [] Vascular disease [] Rheumatic disease [x] Other:left axilla - thickened skin - had prior     Tests: [x] X-Ray: [x] MRI:  [] Other:     Medications: [x] Refer to full medical record [] None [] Other:  Allergies:      [x] Refer to full medical record [] None [] Other:    Function:  Hand Dominance  [x] Right  [] Left    Pain:  [x] Yes  [] No Location: left axilla Pain Rating: (0-10 scale) 8/10  Pain altered Tx:  [x] Yes  [] No  Action:  Symptoms:  [] Improving [] Worsening [x] Same- since surgery  Better:  [] AM    [] PM    [] Sit    [] Rise/Sit    []Stand    [] Walk    [] Lying    [] Other:  Worse: [] AM    [] PM    [] Sit    [] Rise/Sit    []Stand    [] Walk    [] Lying    [] Bend                             [] Valsalva    [x] Other:raising arm  Sleep: [] OK    [] Disturbed    Patient lives with: alone   In what type of home [x]  One story   [] Two story   [] Split level   Number of stairs to enter 1 step   With handrail on the []  Right to enter   [] Left to enter   Bathroom has a []  Tub only  [x] Tub/shower combo   [] Walk in shower       Washing machine is on [x]  Main level   [] Second level   [] Basement   Employer N/A   Job Status    [x]  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   Work activities/duties Enjoys walking 3 days a week, yoga 2x a week (not returned yet due to drs appts - hopes to start next week - [facility])       ADL/IADL Previous level of function Current level of function Who currently assists the patient with task   Bathing  [x] Independent  [] Assist [x] Independent  [] Assist    Dress/grooming [x] Independent  [] Assist [x] Independent  [] Assist    Transfer/mobility [x] Independent  [] Assist [x] Independent  [] Assist    Feeding [x] Independent  [] Assist [x] Independent  [] Assist    Toileting [x] Independent  [] Assist [x] Independent  [] Assist Driving [x] Independent  [] Assist [x] Independent  [] Assist    Housekeeping [x] Independent  [] Assist [x] Independent  [] Assist    Grocery shop/meal prep [x] Independent  [] Assist [x] Independent  [] Assist      Gait Prior level of function Current level of function    [x] Independent  [] Assist [x] Independent  [] Assist   Device: [x] Independent [x] Independent    [] Straight Cane [] Quad cane [] Straight Cane [] Quad cane    [] Standard walker [] Rolling walker   [] 4 wheeled walker [] Standard walker [] Rolling walker   [] 4 wheeled walker    [] Wheelchair [] Wheelchair         TEST INITIAL DATE RE-EVAL DATE     VISIT# DISCHARGE DATE: VISIT#   Blood Pressure      Resting Heart Rate            5x Sit to Stands            seconds                Unable            LOB            Must use arms to rise         TUG TEST              seconds              seconds              seconds         6 MIN WALK Baseline/end of test Baseline/end of test Baseline/end of test   HR (bpm)      SpO2      dyspnea      fatigue      Distance (ft)      Blood Pressure      Brief Fatigue Inventory  (0: none, 1-3: mild, 4-6: moderate,   7-10: severe) 6.1, moderate fatigue     Functional Test- UEFI     36% overall perceived impairment       Objective:     ROM  °A/P END FEEL STRENGTH    Left Right  Left Right   Shld Flex 147 142  5 5    Shld Abd 117 122  5 5    Shld IR        ER @ 0 45  90        Elbow flex wnl wnl  5 5   ext wnl wnl  5 5                    wnl wnl  56# 69#       OBSERVATION No Deficit Deficit Not Tested Comments   Forward Head [x] [] []    Rounded Shoulders [] [x] []    Kyphosis [x] [] []    Scap Height/Position [x] [] []    Winging [x] [] []    SH Rhythm [x] [] []    NEUROLOGICAL       Cervical ROM/Quadrant [x] [] []    Reflexes [] [] [x]    Compression/Distraction [] [] [x]    Sensation [x] [] []        FUNCTION Normal Difficult Unable   Overhead reach [x] [] []   Underarm reach  [x] [] []   Groom/Dress [x] [] [] Bra/Shirt tuck [x] [] []   Lift/Carry [x] [] []    [] [] []     Comments:    Measurements Upper Extremity - 6- breast clinic  Measurements taken from the base of fingernail on D3 in cm. Fingers measured at base. Measurements taken from the base of fingernail on D3 in cm. Fingers measured at base.      Measurements in Centimeters  Right Left      Dorsum 11 21.1  20.4   Wrist 16 21.4  18.2   Lower Forearm   23 18.8  18.4   Upper Forearm   33  25.8  26   Olecranon  43  29.4  29.4   Lower Bicep   53  35  35.8   Upper Bicep/SHLD 69 40.2  39.9   Total 191.7  188. 1          []Heaviness in arm? NO  []Numbness or tingling in chest/arm? NO  [x] Swelling in chest, armpit, arm? YES chest, NO arm  [x]Discomfort with clothing? Deward Anival from tightness?around lateral left chest area    [employer] Sign (middle finger or base of second toe)(edema concerns)  [x] Negative - able to pull up on skin  []Positive - unable to pull up on skin    Assessment:  Problems:    [x] ? Pain:left chest/axilla area  [x] ? Function:disturbed sleep, difficulty opening jars  [x] Other: has not resumed previous fitness classes. Prehab visit goal:  1. Initiate Oncology Rehab to assess any deficits that may limit/prevent upcoming treatments related to cancer diagnosis. 2. Screen for risk factors such as limited ROM, strength deficits, balance impairments, tissue restrictions and cardiovascular limitations. 3. Establish a Rehabilitation plan to help cancer survivors maintain/regain quality of life while undergoing cancer treatments. STG: (to be met in 5 treatments)  1. ? Pain: Stabilize shoulder pain and ensure optimal management of pain during all ADL's (home, occupation, community and recreation)  2. Maintain /optimize AROM of bilateral shoulders for functional activity. 3. Increase strength in bilateral shoulders and scapula for good postural stability. 4. Independent with Home Exercise Programs  5.  Demonstrate Knowledge of fall prevention met 7-8-2021 low fall risk per Rosalina Sorto, no intervention needed  6. Education on signs and symptoms, precautions regarding lymphedema. 7. Educated breast care exercises. Met 7-8-2021 will continue to review    LTG: (to be met in 10 treatments)  1. Maintain /optimize ROM of bilateral shoulders for functional activity to improve QOL. 2. Stabilize shoulder pain and ensure optimal management of pain during all ADLS (home, occupation, community and recreation)  5. Improve tissue mobility of chest wall and axilla to allow for more normal motion and function  6. Control/mitigate side effects/late effects of Cancer treatment. Patient goals: keep left arm viable    Rehab Potential:  [x] Good  [] Fair  [] Poor   Suggested Professional Referral:  [] No  [x] Yes:ongoing with oncology and surgeon  Barriers to Goal Achievement:  [x] No  [] Yes:  Domestic Concerns:  [x] No  [] Yes:    Pt. Education:  [x] Plans/Goals, Risks/Benefits discussed  [x] Home exercise program  Method of Education: [x] Verbal  [x] Demo  [x] Written  HEP 7-8-2021 wall slides, table slides, corner stretch, side bending, winging, scapular squeezes  Comprehension of Education:  [x] Verbalizes understanding. [] Demonstrates understanding. [] Needs Review. [] Demonstrates/verbalizes understanding of HEP/Ed previously given.  []  Lymphedema education sheet issued  [x]  Breast exercises initiated by third visit. - met 7-8-2021    Treatment Plan:  [x] Therapeutic Exercise   81672     [] Therapeutic Activity  22530 [] Vasopneumatic cold with compression  73348    [] Gait Training   02155 [] Ultrasound   96190   [] Neuromuscular Re-education  89448 [] Electrical Stimulation Unattended  02746   [x] Manual Therapy  21183 [] Electrical Stimulation Attended  41856   [x] Instruction in HEP  [] Dry Needling   [] Aquatic Therapy   25614 [] Cold/hotpack    [x] Massage   54039      [] Lumbar/Cervical Traction  04370       Frequency: 1 x/week for 10 visits    Todays Treatment:  Modalities: PORI manual treatment to left UE/chest area  Precautions:  Possible dexamethazone allergy (had for breathing, \"didn't agree with me\", made vocal cord worse)  Exercises:  Exercise Reps/ Time Weight/ Level Comments             Diaphragmatic breathing      Bye bye forward, sideways      Wand flexion       Bye, bye ex 3x                 Elbow winging supine X  HEP   Table flexion slides X  HEP   Scapular retraction X  HEP   Side bending X  HEP   Corner pec stretch X  HEP   Elbow flexion                  Other:    Specific Instructions for next treatment:      Evaluation Complexity:  History (Personal factors, comorbidities) [] 0 [x] 1-2 [] 3+   Exam (limitations, restrictions) [x] 1-2 [] 3 [] 4+   Clinical presentation (progression) [x] Stable [] Evolving  [] Unstable   Decision Making [x] Low [] Moderate [] High    [x] Low Complexity [] Moderate Complexity [] High Complexity       Treatment Charges: Mins Units   [x] Evaluation       [x]  Low       []  Moderate       []  High 30 1   []  Modalities     [x]  Ther Exercise 5 0   [x]  Manual Therapy 35 3   []  Ther Activities     []  Aquatics     []  Vasocompression     []  Other       TOTAL TREATMENT TIME: 70 min    Time in: 1: 41 pm   Time Out: 2: 52 pm    Electronically signed by: Randolph Infante PT        Physician Signature:________________________________Date:__________________  By signing above or cosigning this note, I have reviewed this plan of care and certify a need for medically necessary rehabilitation services.

## 2021-07-08 NOTE — CONSULTS
Andi Fall Risk Assessment    Patient Name:  Jacob Rizzo  : 1940        Risk Factor Scale  Score   History of Falls [] Yes  [x] No 25  0 0   Secondary Diagnosis [] Yes  [x] No 15  0 0   Ambulatory Aid [] Furniture  [] Crutches/cane/walker  [x] None/bedrest/wheelchair/nurse 30  15  0 0   IV/Heparin Lock [] Yes  [x] No 20  0 0   Gait/Transferring [] Impaired  [] Weak  [x] Normal/bedrest/immobile 20  10  0 0   Mental Status [] Forgets limitations  [x] Oriented to own ability 15  0 0      Total:0     Based on the Assessment score: check the appropriate box.     [x]  No intervention needed   Low =   Score of 0-24    []  Use standard prevention interventions Moderate =  Score of 24-44   [] Give patient handout and discuss fall prevention strategies   [] Establish goal of education for patient/family RE: fall prevention strategies    []  Use high risk prevention interventions High = Score of 45 and higher   [] Give patient handout and discuss fall prevention strategies   [] Establish goal of education for patient/family Re: fall prevention strategies   [] Discuss lifeline / other resources    Electronically signed by:   Arabella Nunez, PT  Date: 2021

## 2021-07-08 NOTE — PLAN OF CARE
[] [facility]) [facility] &  Therapy  [address]  [phone]  F: [phone] [x] [address]  [facility] [address]   [address]  P: [phone]  F: [phone] [] [address] &  Therapy  [address]  P: [phone]  F: [phone] [] [address]  P: [phone]  F: [phone] [] [address]  [facility]   [address]   [place]: [phone]  F: [phone]        Physical Therapy Plan of Care    Date:  2021  Patient: James Lama  : 1940  MRN: [id number]  Physician: Dr. Allan Serrano: Jose Parada ($40 copay, [facility] after evaluation)  Medical Diagnosis:  Left breast CA              Rehab Codes: R07.89, l90.5, R60.0,  Onset [id number] lumpectomy               Next 's appt. :        Subjective:   CC: Pan in left chest/axilla area. States she was told it could be drained from Dr. Angi Gilmore, she also thinks it could resolve on its own. Fatigue seems worsening with Femara, but not as bad as with Arimidex. Taking a nap when needed and needed then immediately. Was not napping prior.      HPI:   Patient seen 2021 in breast clinic      Assessment:  Measurements taken from the base of fingernail on D3 in cm. Fingers measured at base.      Measurements in Centimeters  Right Left      Dorsum 11 21.1  20.4   Wrist 16 21.4  18.2   Lower Forearm   23 18.8  18.4   Upper Forearm   33  25.8  26   Olecranon  43  29.4  29.4   Lower Bicep   53  35  35.8   Upper Bicep/SHLD 69 40.2  39.9   Total 191.7  188. 1      Problems:    [x]? ? Pain:left chest/axilla area  [x]? ? Function:disturbed sleep, difficulty opening jars  [x]? Other: has not resumed previous fitness classes.      Prehab visit goal:  1.   Initiate Oncology Rehab to assess any deficits that may limit/prevent upcoming treatments related to cancer diagnosis. 2. Screen for risk factors such as limited ROM, strength deficits, balance impairments, tissue restrictions and cardiovascular limitations. 3. Establish a Rehabilitation plan to help cancer survivors maintain/regain quality of life while undergoing cancer treatments.         STG: (to be met in 5 treatments)  1. ? Pain: Stabilize shoulder pain and ensure optimal management of pain during all ADL's (home, occupation, community and recreation)  2. Maintain /optimize AROM of bilateral shoulders for functional activity. 3. Increase strength in bilateral shoulders and scapula for good postural stability. 4. Independent with Home Exercise Programs  5. Demonstrate Knowledge of fall prevention met 7-8-2021 low fall risk per Carry Cambric, no intervention needed  6. Education on signs and symptoms, precautions regarding lymphedema. 7.   Educated breast care exercises. Met 7-8-2021 will continue to review     LTG: (to be met in 10 treatments)  1. Maintain /optimize ROM of bilateral shoulders for functional activity to improve QOL. 2. Stabilize shoulder pain and ensure optimal management of pain during all ADLS (home, occupation, community and recreation)  5. Improve tissue mobility of chest wall and axilla to allow for more normal motion and function  6.    Control/mitigate side effects/late effects of Cancer treatment.     Patient goals: keep left arm viableTreatment Plan:  [x] Therapeutic Exercise   90082  [] Iontophoresis: 4 mg/mL Dexamethasone Sodium Phosphate  mAmin  45897   [] Therapeutic Activity  11656 [] Vasopneumatic cold with compression  39856    [] Gait Training   17341 [] Ultrasound   48666   [] Neuromuscular Re-education  00453 [] Electrical Stimulation Unattended  79023   [x] Manual Therapy  39447 [] Electrical Stimulation Attended  95737   [x] Instruction in HEP  [] Lumbar/Cervical Traction  [id number]   [] Aquatic Therapy   [id number] [] Cold/hotpack    [x] Massage   [id number]      [] Dry Needling, 1 or 2 muscles  27688   [] Biofeedback, first 15 minutes   30972  [] Biofeedback, additional 15 minutes   69040 [] Dry Needling, 3 or more muscles  84149       Frequency:  1 x/week for 10 visits    Electronically signed by: Arabella Nunez PT        Physician Signature:________________________________Date:__________________  By signing above or cosigning this note, I have reviewed this plan of care and certify a need for medically necessary rehabilitation services.

## 2021-07-14 ENCOUNTER — TELEPHONE (OUTPATIENT)
Dept: ONCOLOGY | Age: 81
End: 2021-07-14

## 2021-07-14 NOTE — TELEPHONE ENCOUNTER
Called Twyla Santos and left message. Reminded her that I am her oncology nurse navigator at [facility] ([facility]). Let her know that I wanted to check in with her, see how she is doing. Reminded her that I am here as a resource, support, and to help facilitate care. Let her know I wanted to make sure that she was able to get the medication that Dr. Chichi Mares had ordered and make sure that she was taking that daily. I want to check in to see if she is having any problems with the medication. I also wanted to let her know that if she decides she wants to do genetic testing I can help her get set up for that appointment. Let her know it is ok to do genetic testing but she can also choose not to. Encouraged her to call me. Left my name and contact information.

## 2021-07-26 ENCOUNTER — HOSPITAL ENCOUNTER (OUTPATIENT)
Dept: PHYSICAL THERAPY | Facility: CLINIC | Age: 81
Setting detail: THERAPIES SERIES
Discharge: HOME | End: 2021-07-26
Payer: MEDICARE

## 2021-07-26 PROCEDURE — 97140 MANUAL THERAPY 1/> REGIONS: CPT

## 2021-07-26 NOTE — FLOWSHEET NOTE
[] [address]  [address]  [phone]  F: [phone] [x] [address]  [facility] 36   [address]  P: [phone]  F: [phone] [] [address]  [address]  P: [phone]  F: [phone] [] [address]  P: [phone]  F: [phone] [] [address]  [facility]   [address]   [place]: [phone]  F: [phone]      Physical Therapy Daily Treatment Note    Date:  2021  Patient Name:  Radha Espinoza    :  1940  MRN: [id number]  Physician: Dr. Santiago Later: Felix Juarez ([facility] 10 visits thru 2021)  Medical Diagnosis:  Left breast CA              Rehab Codes: R07.89, l90.5, R60.0,  Onset VGEC:0- lumpectomy               Next Dr's appt. :      Visit# / total visits: 2/10; Cancels/No Shows: 0 / 0    Type of Cancer breast Er/MN+, HER 2 negative  Location: left breast  Lymph node Involvement  Chemotherapy- no further per patient, \"not interested in\"  Currently Femara  Tried Arimidex - couldn't take \" I was on the couch\"  Radiation - possible , pt states she does not plan on having it, still discussing with physician  Surgery- Dr. Smith- lumpectomy- 2021    Subjective:    Pain:  [] Yes  [x] No Location:  Pain Rating: (0-10 scale) 0/10  \"I don't really have pain, occasional back soreness, able to [employer] out\"  Pain altered Tx:  [] No  [] Yes  Action:  Comments: Patient reporting she is trying to [employer]" noticing tiredness and fatigue with medication. Tried to cut bushes in the yard this am, came back in after ~ 30 minutes. Measurements Upper Extremity - 6- breast clinic  Measurements taken from the base of fingernail on D3 in cm. Fingers measured at base.      Measurements taken from the base of fingernail on D3 in cm. Fingers measured at base.      Measurements in Centimeters  Right Left      Dorsum 11 21.1  20.4   Wrist 16 21.4  18.2   Lower Forearm   23 18.8  18.4   Upper Forearm   33  25.8  26   Olecranon  43  29.4  29.4   Lower Bicep   53  35  35.8   Upper Bicep/SHLD 69 40.2  39.9   Total 191.7  188. 1          Objective:  Modalities: PORI manual treatment to left UE/chest area  Precautions:  Possible dexamethazone allergy (had for breathing, \"didn't agree with me\", made vocal cord worse)  Exercises:  Exercise Reps/ Time Weight/ Level Comments   PORI protocol to left chest area 40 min                           Diaphragmatic breathing         Bye bye forward, sideways         Wand flexion          Bye, bye ex 3x                           Elbow winging supine X   HEP   Table flexion slides X   HEP   Scapular retraction X   HEP   Side bending X   HEP   Corner pec stretch X   HEP   Elbow flexion                             Other: left shoulder ROM, stretching        Treatment Charges: Mins Units   []  Modalities     []  Ther Exercise     [x]  Manual Therapy 42 3   []  Ther Activities     []  Aquatics     []  Vasocompression     []  Other     Total Treatment time 42 3       Assessment: [x] Progressing toward goals. .States she is trying to do her exercises. At time does in bed at the end of the night. Really \"pushing myself\" to get it done. Left inferior chest around incision area pt reports is \"hard and the chest doesn't look right\"  Superior to incision mild edema observed. With manual PORI technique, focus on inferior left chest, decreased palpable tissue density. Encouraged pt not to overdo it with home activities but to pace herself as she adjust to the new medicine. WNL left shoulder PROM supine flexion and abduction.       [] No change     [] Other:  [x] Patient would continue to benefit from skilled physical therapy services in order to: address tissue density in left inferior chest, left shoulder ROM and continue manual work to decrease fluid build up from lumpectomy. Goal to returning pt to full functional use without difficulty of left arm.     Prehab visit goal:  1.  Initiate Oncology Rehab to assess any deficits that may limit/prevent upcoming treatments related to cancer diagnosis. Met  2. Screen for risk factors such as limited ROM, strength deficits, balance impairments, tissue restrictions and cardiovascular limitations.    3. Establish a Rehabilitation plan to help cancer survivors maintain/regain quality of life while undergoing cancer treatments.         STG: (to be met in 5 treatments)  1. ? Pain: Stabilize shoulder pain and ensure optimal management of pain during all ADL's (home, occupation, community and recreation)  2. Maintain /optimize AROM of bilateral shoulders for functional activity. 3. Increase strength in bilateral shoulders and scapula for good postural stability. 4. Independent with Home Exercise Programs  5. Demonstrate Knowledge of fall prevention met 7-8-2021 low fall risk per Cyndee Alexandra, no intervention needed  6.   Education on signs and symptoms, precautions regarding lymphedema.        7.   Educated breast care exercises. Met 7-8-2021 will continue to review     LTG: (to be met in 10 treatments)  1. Maintain /optimize ROM of bilateral shoulders for functional activity to improve QOL. 2. Stabilize shoulder pain and ensure optimal management of pain during all ADLS (home, occupation, community and recreation)  5.   Improve tissue mobility of chest wall and axilla to allow for more normal motion and function  6.   Control/mitigate side effects/late effects of Cancer treatment.       Pt. Education:  [] Yes  [] No  [] Reviewed Prior HEP/Ed  Method of Education: [x] Verbal  [x] Demo  [] Written  Comprehension of Education:  [] Verbalizes understanding. [] Demonstrates understanding. [x] Needs review.   [x] Demonstrates/verbalizes HEP/Ed previously given. Plan: [x] Continue current frequency toward long and short term goals. [x] Specific Instructions for subsequent treatments: PORI protocol to left chest/UE as needed. Continue posture education and shoulder AROM to maximize functional use.         Time In:2:53 pm            Time Out: 3: 35 pm    Electronically signed by:  Jerica Heredia PT

## 2021-07-29 ENCOUNTER — TELEPHONE (OUTPATIENT)
Dept: ONCOLOGY | Age: 81
End: 2021-07-29

## 2021-07-29 ENCOUNTER — HOSPITAL ENCOUNTER (OUTPATIENT)
Dept: PHYSICAL THERAPY | Facility: CLINIC | Age: 81
Setting detail: THERAPIES SERIES
Discharge: HOME | End: 2021-07-29
Payer: MEDICARE

## 2021-07-29 PROCEDURE — 97140 MANUAL THERAPY 1/> REGIONS: CPT

## 2021-07-29 NOTE — TELEPHONE ENCOUNTER
ASSISTING Vicky Roldan, RN NAVIGATOR. I CALLED TO CHECK IN WITH PATIENT TO SEE HOW SHE IS DOING. LEFT MESSAGE AS TO WHY I CALLED AND REMINDED HER THAT SHE CAN CALL [facility] AT ANY TIME. LEFT [facility] AND MY NUMBERS AS A CALL BACK.

## 2021-07-29 NOTE — FLOWSHEET NOTE
[] [address]  [address]  [phone]  F: [phone] [x] [address]  [facility] 36   [address]  P: [phone]  F: [phone] [] [place]  [address]  P: [phone]  F: [phone] [] [address]  P: [phone]  F: [phone] [] [address]  [facility]   [address]   [place]: [phone]  F: [phone]      Physical Therapy Daily Treatment Note    Date:  2021  Patient Name:  Giuliano Garner    :  1940  MRN: [id number]  Physician: Dr. Reece Lombard: Tanesha Torres ([address] [facility] 10 visits thru 2021)  Medical Diagnosis:  Left breast CA              Rehab Codes: R07.89, l90.5, R60.0,  Onset [id number] lumpectomy               Next Dr's appt. 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 Cornelius Sotelo- lumpectomy- 2021    Subjective:    Pain:  [] Yes  [x] No Location: \"tightness\" Pain Rating: (0-10 scale) 0/10    Pain altered Tx:  [] No  [] Yes  Action:  Comments: Patient states fluid felt like it \"came back quicker this time than after the last visits\"      Measurements Upper Extremity - 2021 breast clinic  Measurements taken from the base of fingernail on D3 in cm. Fingers measured at base.      Measurements taken from the base of fingernail on D3 in cm.  Fingers measured at base.      Measurements in Centimeters  Right Left      Dorsum 11 21.1  20.4   Wrist 16 21.4  18.2   Lower Forearm   23 18.8  18.4   Upper Forearm   33  25.8  26   Olecranon  43  29.4  29.4   Lower Bicep   53  35  35.8   Upper Bicep/SHLD 69 40.2  39.9   Total 191.7  188. 1          Objective:  Modalities: PORI manual treatment to left UE/chest area  Precautions:  Possible dexamethazone allergy (had for breathing, \"didn't agree with me\", made vocal cord worse)  Exercises:  Exercise Reps/ Time Weight/ Level Comments   PORI protocol to left chest area 40 min                           Diaphragmatic breathing         Bye bye forward, sideways         Wand flexion          Bye, bye ex 3x                           Elbow winging supine X   HEP   Table flexion slides X   HEP   Scapular retraction X   HEP   Side bending X   HEP   Corner pec stretch X   HEP   Elbow flexion                             Other: left shoulder ROM, stretching        Treatment Charges: Mins Units   []  Modalities     []  Ther Exercise     [x]  Manual Therapy 40 3   []  Ther Activities     []  Aquatics     []  Vasocompression     []  Other     Total Treatment time 40 3       Assessment: [x] Progressing toward goals. .States she is trying to do her exercises. .  With manual PORI technique, focus on inferior left chest, decreased palpable tissue density. Mild tenderness also around sternal area, pt reports she feels \"tingly\" at sternal area. WNL left shoulder PROM supine flexion and abduction. [] No change     [] Other:  [x] Patient would continue to benefit from skilled physical therapy services in order to: address tissue density in left inferior chest, left shoulder ROM and continue manual work to decrease fluid build up from lumpectomy. Goal to returning pt to full functional use without difficulty of left arm.     Prehab visit goal:  1.  Initiate Oncology Rehab to assess any deficits that may limit/prevent upcoming treatments related to cancer diagnosis. Met  2. Screen for risk factors such as limited ROM, strength deficits, balance impairments, tissue restrictions and cardiovascular limitations.    3. Establish a Rehabilitation plan to help cancer survivors maintain/regain quality of life while undergoing cancer treatments. met        STG: (to be met in 5 treatments)  1. ? Pain: Stabilize shoulder pain and ensure optimal management of pain during all ADL's (home, occupation, community and recreation)  2. Maintain /optimize AROM of bilateral shoulders for functional activity. 3. Increase strength in bilateral shoulders and scapula for good postural stability. 4. Independent with Home Exercise Programs  5. Demonstrate Knowledge of fall prevention met 7-8-2021 low fall risk per Jenny Hope, no intervention needed  6.   Education on signs and symptoms, precautions regarding lymphedema.        7.   Educated breast care exercises. Met 7-8-2021 will continue to review     LTG: (to be met in 10 treatments)  1. Maintain /optimize ROM of bilateral shoulders for functional activity to improve QOL. 2. Stabilize shoulder pain and ensure optimal management of pain during all ADLS (home, occupation, community and recreation)  5.   Improve tissue mobility of chest wall and axilla to allow for more normal motion and function  6.   Control/mitigate side effects/late effects of Cancer treatment.       Pt. Education:  [] Yes  [] No  [] Reviewed Prior HEP/Ed  Method of Education: [x] Verbal  [] Demo  [] Written  Comprehension of Education:  [] Verbalizes understanding. [] Demonstrates understanding. [] Needs review. [x] Demonstrates/verbalizes HEP/Ed previously given. Plan: [x] Continue current frequency toward long and short term goals. [x] Specific Instructions for subsequent treatments: PORI protocol to left chest/UE as needed. Continue posture education and shoulder AROM to maximize functional use.         Time In: 10: 58 am   Time Out: 11:40 am    Electronically signed by:  Yarelis Maxwell, PT

## 2021-07-29 NOTE — TELEPHONE ENCOUNTER
Fredirick Dillon called and relates she is still struggling with being so tired. She relates it to the femara tablet. She is seeing onc rehab. Encouraged her stick with rehab and the medication. She see's Dr. Aroldo Cat on 8/16/21. Encouraged her to let dr know how she is feeling. If the fatigue is better or if it is not. Kita Avila is agreeable and thankful for the call. Pt on pending.

## 2021-08-02 ENCOUNTER — HOSPITAL ENCOUNTER (OUTPATIENT)
Dept: PHYSICAL THERAPY | Facility: CLINIC | Age: 81
Setting detail: THERAPIES SERIES
Discharge: HOME | End: 2021-08-02
Payer: MEDICARE

## 2021-08-02 PROCEDURE — 97140 MANUAL THERAPY 1/> REGIONS: CPT

## 2021-08-02 NOTE — FLOWSHEET NOTE
[] [facility] [address]  [address]  [phone]  F: [phone] [x] [address]  [facility] 36   [address]  P: [phone]  F: [phone] [] [address]  [address]  P: [phone]  F: [phone] [] [address]  P: [phone]  F: [phone] [] [address]  [facility]   [address]   [place]: [phone]  F: [phone]      Physical Therapy Daily Treatment Note    Date:  2021  Patient Name:  Gladis Feldman    :  1940  MRN: [id number]  Physician: Dr. Juan Barreto: Arlette Christie ([address] [facility] 10 visits thru 2021)  Medical Diagnosis:  Left breast CA              Rehab Codes: R07.89, l90.5, R60.0,  Onset KIMBERLEY:5- lumpectomy               Next Dr's appt. :      Visit# / total visits: 4/10; Cancels/No Shows: 0 / 0    Type of Cancer breast Er/ID+, HER 2 negative  Location: left breast  Lymph node Involvement  Chemotherapy- no further per patient, \"not interested in\"  Currently Femara  Tried Arimidex - couldn't take \" I was on the couch\"  Radiation - possible , pt states she does not plan on having it, still discussing with physician  Surgery- Dr. Ilia Montez- lumpectomy- 2021    Subjective:    Pain:  [] Yes  [x] No Location: \"tightness\" Pain Rating: (0-10 scale) 0/10    Pain altered Tx:  [] No  [] Yes  Action:  Comments: Patient states fluid felt like it \"came back quicker this time than after the last visits\"      Measurements Upper Extremity - 2021 breast clinic  Measurements taken from the base of fingernail on D3 in cm. Fingers measured at base.      Measurements taken from the base of fingernail on D3 in cm.  Fingers measured at base.      Measurements in Centimeters  Right Left      Dorsum 11 21.1  20.4   Wrist 16 21.4  18.2   Lower Forearm   23 18.8  18.4   Upper Forearm   33  25.8  26   Olecranon  43  29.4  29.4   Lower Bicep   53  35  35.8   Upper Bicep/SHLD 69 40.2  39.9   Total 191.7  188. 1          Objective:  Modalities: PORI manual treatment to left UE/chest area  Precautions:  Possible dexamethazone allergy (had for breathing, \"didn't agree with me\", made vocal cord worse)  Exercises:  Exercise Reps/ Time Weight/ Level Comments   PORI protocol to left chest area 40 min                           Diaphragmatic breathing         Bye bye forward, sideways         Wand flexion          Bye, bye ex 3x                           Elbow winging supine X   HEP   Table flexion slides X   HEP   Scapular retraction X   HEP   Side bending X   HEP   Corner pec stretch X   HEP   Elbow flexion                             Other: left shoulder ROM, stretching        Treatment Charges: Mins Units   []  Modalities     []  Ther Exercise     [x]  Manual Therapy 50 3   []  Ther Activities     []  Aquatics     []  Vasocompression     []  Other     Total Treatment time 50 3   8-2-2021 AROM left shoulder  Flexion  151  abd   154    Assessment: [x] Progressing toward goals. .States she is trying to do her exercises. .  With manual PORI technique, focus on inferior left chest, decreased palpable tissue density. No reports of sternal pain today, decreasing hardness per patient report and palpable left inferior chest. Functional shoulder AROM  Patient looking into 'spa\" day and exercises classes through [facility]. [] No change     [] Other:  [x] Patient would continue to benefit from skilled physical therapy services in order to: address tissue density in left inferior chest, left shoulder ROM and continue manual work to decrease fluid build up from lumpectomy.   Goal to returning pt to full functional use without difficulty of left arm.     Prehab visit goal:  1.  Initiate Oncology Rehab to assess any deficits that may limit/prevent upcoming treatments related to cancer diagnosis. Met  2. Screen for risk factors such as limited ROM, strength deficits, balance impairments, tissue restrictions and cardiovascular limitations.    3. Establish a Rehabilitation plan to help cancer survivors maintain/regain quality of life while undergoing cancer treatments. met        STG: (to be met in 5 treatments)  1. ? Pain: Stabilize shoulder pain and ensure optimal management of pain during all ADL's (home, occupation, community and recreation)met 8-2-2021  2. Maintain /optimize AROM of bilateral shoulders for functional activity. met 8-2-2021  3. Increase strength in bilateral shoulders and scapula for good postural stability. 4. Independent with Home Exercise Programs, met 8-2-2021  5. Demonstrate Knowledge of fall prevention met 7-8-2021 low fall risk per Jake Gibson, no intervention needed  6.   Education on signs and symptoms, precautions regarding lymphedema.        7.   Educated breast care exercises. Met 7-8-2021 will continue to review     LTG: (to be met in 10 treatments)  1. Maintain /optimize ROM of bilateral shoulders for functional activity to improve QOL. 2. Stabilize shoulder pain and ensure optimal management of pain during all ADLS (home, occupation, community and recreation)  5.   Improve tissue mobility of chest wall and axilla to allow for more normal motion and function  6.   Control/mitigate side effects/late effects of Cancer treatment.       Pt. Education:  [] Yes  [] No  [x] Reviewed Prior HEP/Ed  Method of Education: [x] Verbal  [] Demo  [] Written  Comprehension of Education:  [] Verbalizes understanding. [] Demonstrates understanding. [] Needs review. [x] Demonstrates/verbalizes HEP/Ed previously given. Plan: [x] Continue current frequency toward long and short term goals. [x] Specific Instructions for subsequent treatments: PORI protocol to left chest/UE as needed. Continue posture education and shoulder AROM to maximize functional use.         Time In: 12:10 pm   Time Out: 1:05 pm    Electronically signed by:  Kisha Paze PT

## 2021-08-09 ENCOUNTER — HOSPITAL ENCOUNTER (OUTPATIENT)
Dept: PHYSICAL THERAPY | Facility: CLINIC | Age: 81
Setting detail: THERAPIES SERIES
Discharge: HOME | End: 2021-08-09
Payer: MEDICARE

## 2021-08-09 PROCEDURE — 97140 MANUAL THERAPY 1/> REGIONS: CPT

## 2021-08-09 NOTE — FLOWSHEET NOTE
Centimeters  Right Left      Dorsum 11 21.1  20.4   Wrist 16 21.4  18.2   Lower Forearm   23 18.8  18.4   Upper Forearm   33  25.8  26   Olecranon  43  29.4  29.4   Lower Bicep   53  35  35.8   Upper Bicep/SHLD 69 40.2  39.9   Total 191.7  188. 1          Objective:  Modalities: PORI manual treatment to left UE/chest area  Precautions:  Possible dexamethazone allergy (had for breathing, \"didn't agree with me\", made vocal cord worse)  Exercises:  Exercise Reps/ Time Weight/ Level Comments   PORI protocol to left chest area 45 min                           Diaphragmatic breathing         Bye bye forward, sideways         Wand flexion          Bye, bye ex 3x                           Elbow winging supine X   HEP   Table flexion slides X   HEP   Scapular retraction X   HEP   Side bending X   HEP   Corner pec stretch X   HEP   Elbow flexion                             Other: left shoulder ROM, stretching        Treatment Charges: Mins Units   []  Modalities     []  Ther Exercise     [x]  Manual Therapy 45 3   []  Ther Activities     []  Aquatics     []  Vasocompression     []  Other     Total Treatment time 45 3   8-2-2021 AROM left shoulder  8-9-2021  Flexion  151   155  abd   154   154    Assessment: [x] Progressing toward goals. .  Patient reports little to no irritation of left chest area. Full functional use of arm    She reports she stopped Arimidex due to nausea, hasn't taken for 2 days, Needed 3-4 days to recover if did any light gardening and was taking it. [facility] [] No change     [] Other:  [x] Patient would continue to benefit from skilled physical therapy services in order to: address tissue density in left inferior chest, left shoulder ROM and continue manual work to decrease fluid build up from lumpectomy.   Goal to returning pt to full functional use without difficulty of left arm.     Prehab visit goal:  1.  Initiate Oncology Rehab to assess any deficits that may limit/prevent upcoming treatments related to cancer diagnosis. Met  2. Screen for risk factors such as limited ROM, strength deficits, balance impairments, tissue restrictions and cardiovascular limitations.    3. Establish a Rehabilitation plan to help cancer survivors maintain/regain quality of life while undergoing cancer treatments. met        STG: (to be met in 5 treatments)  1. ? Pain: Stabilize shoulder pain and ensure optimal management of pain during all ADL's (home, occupation, community and recreation)met 8-2-2021  2. Maintain /optimize AROM of bilateral shoulders for functional activity. met 8-2-2021  3. Increase strength in bilateral shoulders and scapula for good postural stability. 4. Independent with Home Exercise Programs, met 8-2-2021  5. Demonstrate Knowledge of fall prevention met 7-8-2021 low fall risk per Jake Gibson, no intervention needed  6.   Education on signs and symptoms, precautions regarding lymphedema.        7.   Educated breast care exercises. Met 7-8-2021 will continue to review     LTG: (to be met in 10 treatments)  1. Maintain /optimize ROM of bilateral shoulders for functional activity to improve QOL. Met 8-9-2021  Stabilize shoulder pain and ensure optimal management of pain during all ADLS (home, occupation, community and recreation)Met 8-9-2021  5.   Improve tissue mobility of chest wall and axilla to allow for more normal motion and functionMet 8-9-2021  6.   Control/mitigate side effects/late effects of Cancer treatment. Met 8-9-2021       Pt. Education:  [] Yes  [] No  [x] Reviewed Prior HEP/Ed  Method of Education: [x] Verbal  [x] Demo  [] Written  Comprehension of Education:  [] Verbalizes understanding. [] Demonstrates understanding. [] Needs review. [x] Demonstrates/verbalizes HEP/Ed previously given. Plan: [] Continue current frequency toward long and short term goals.     [] Specific Instructions for subsequent treatments: PORI protocol to left

## 2021-08-12 ENCOUNTER — APPOINTMENT (OUTPATIENT)
Dept: PHYSICAL THERAPY | Facility: CLINIC | Age: 81
End: 2021-08-12
Payer: MEDICARE

## 2021-08-16 ENCOUNTER — TELEPHONE (OUTPATIENT)
Dept: ONCOLOGY | Age: 81
End: 2021-08-16

## 2021-08-16 ENCOUNTER — OFFICE VISIT (OUTPATIENT)
Dept: ONCOLOGY | Age: 81
End: 2021-08-16
Payer: MEDICARE

## 2021-08-16 VITALS
WEIGHT: 200.5 LBS | SYSTOLIC BLOOD PRESSURE: 165 MMHG | TEMPERATURE: 95.6 F | HEART RATE: 77 BPM | DIASTOLIC BLOOD PRESSURE: 99 MMHG | BODY MASS INDEX: 30.49 KG/M2

## 2021-08-16 DIAGNOSIS — C50.512 MALIGNANT NEOPLASM OF LOWER-OUTER QUADRANT OF LEFT BREAST OF FEMALE, ESTROGEN RECEPTOR POSITIVE (HCC): Primary | ICD-10-CM

## 2021-08-16 DIAGNOSIS — Z17.0 MALIGNANT NEOPLASM OF LOWER-OUTER QUADRANT OF LEFT BREAST OF FEMALE, ESTROGEN RECEPTOR POSITIVE (HCC): Primary | ICD-10-CM

## 2021-08-16 PROCEDURE — 1123F ACP DISCUSS/DSCN MKR DOCD: CPT | Performed by: INTERNAL MEDICINE

## 2021-08-16 PROCEDURE — 99211 OFF/OP EST MAY X REQ PHY/QHP: CPT | Performed by: INTERNAL MEDICINE

## 2021-08-16 PROCEDURE — 1090F PRES/ABSN URINE INCON ASSESS: CPT | Performed by: INTERNAL MEDICINE

## 2021-08-16 PROCEDURE — G8427 DOCREV CUR MEDS BY ELIG CLIN: HCPCS | Performed by: INTERNAL MEDICINE

## 2021-08-16 PROCEDURE — 4040F PNEUMOC VAC/ADMIN/RCVD: CPT | Performed by: INTERNAL MEDICINE

## 2021-08-16 PROCEDURE — G8417 CALC BMI ABV UP PARAM F/U: HCPCS | Performed by: INTERNAL MEDICINE

## 2021-08-16 PROCEDURE — 99214 OFFICE O/P EST MOD 30 MIN: CPT | Performed by: INTERNAL MEDICINE

## 2021-08-16 PROCEDURE — G8400 PT W/DXA NO RESULTS DOC: HCPCS | Performed by: INTERNAL MEDICINE

## 2021-08-16 PROCEDURE — 1036F TOBACCO NON-USER: CPT | Performed by: INTERNAL MEDICINE

## 2021-08-16 RX ORDER — ALBUTEROL SULFATE 90 UG/1
AEROSOL, METERED RESPIRATORY (INHALATION)
COMMUNITY

## 2021-08-16 NOTE — PROGRESS NOTES
_  Chief Complaint   Patient presents with    Follow-up     review status of disease    Other     Letrozole having side effects    Nausea    Fatigue     weakness in legs    Other     Blood Pressure and pulse have been elevated     DIAGNOSIS:        Stage T2 a N0 M0 left breast lower outer quadrant invasive lobular carcinoma, ER positive, SC positive, HER-2/janiya not amplified. Sarcoidosis  Multiple comorbidities as listed   CURRENT THERAPY:         Status post left lumpectomy May 18, 2021  Declined radiation therapy  Initially started on Arimidex by her surgeon and she could not tolerate it  Prescribed Femara but she decided to stop it 3 weeks after initiating treatment due to side effects  Declines any further endocrine therapy  BRIEF CASE HISTORY:       Ms. Marcelene Simmonds is a very pleasant [de-identified] y.o. female with history of multiple comorbidities as listed. The patient had bilateral screening mammogram August 20, 2020. She was noted to have suspicious mass in the left breast.  Diagnostic mammogram and ultrasound and biopsy done September 4, 2020. Patient was noted to have suspicious 1 cm mass in the left breast lower outer quadrant. Biopsy showed invasive lobular carcinoma. ER positive, SC positive, HER-2/janiya not amplified. Patient was evaluated by her surgeon but she did not want to have the surgery due to the corona. She was started on endocrine therapy with Arimidex. Patient started treatment in February 2021 due to increasing side effects. Subsequently she was evaluated again by her surgeon and she had lumpectomy 5/18/2021. Final pathology results showed 2.4 cm invasive lobular carcinoma with clear margins. [place] lymph node biopsy was positive for sarcoidosis. The patient is seen today for further management of her breast cancer. The patient did very well after her lumpectomy without any complication. She had fluid collections.   The patient had no symptom preceding her diagnosis of cancer. No chest pain, shortness of breath, cough, sputum or hemoptysis, no back pain or kylie aches, no weight loss or decreased appetite, no fever or night sweating. She denies feeling any lumps or bumps or enlarged lymph nodes. No headaches, and no other complaints. INTERIM HISTORY:   Patient seen for follow-up breast cancer. After recovering from the surgery she had evaluation by her radiation oncologist.  She declined radiation. She was switched from Arimidex to Femara due to side effects. She continued to have significant side effects including difficulty sleeping and body aches and weight loss so she decided to stop it. She felt much better after stopping the treatment. She is not willing to try any further endocrine therapy. GYNECOLOGICAL HISTORY  Menarche at age 14. Menaupause at age 51 hysterectomy. +0. Age at first full term pregnancy 25. No use of HRT. PAST MEDICAL HISTORY: has a past medical history of Asthma, Hypertension, Hypothyroid, and Invasive lobular carcinoma of left breast in [facility]). PAST SURGICAL HISTORY: has a past surgical history that includes Colonoscopy (2012); Breast lumpectomy (Left); Hysterectomy, total abdominal; US BREAST BIOPSY W LOC DEVICE 1ST LESION LEFT (Left, 2020); US BREAST BIOPSY W LOC DEVICE 1ST LESION LEFT (2020); Appendectomy; US PLACE BREAST LOC DEVICE 1ST LESION LEFT (Left, 2021); Breast lumpectomy (Left, 2021); and Thyroidectomy (). CURRENT MEDICATIONS:  has a current medication list which includes the following prescription(s): albuterol sulfate hfa, anoro ellipta, letrozole, loratadine, ascorbic acid, vitamin d, hydrochlorothiazide, atenolol, levothyroxine, aspirin, and nifedipine. ALLERGIES:  is allergic to sulfa antibiotics. FAMILY HISTORY: sister breast cancer 45s, otherwise negative for any hematological or oncological conditions. SOCIAL HISTORY:  reports that she has quit smoking.  Her smoking use included cigarettes. She has never used smokeless tobacco. She reports current alcohol use. She reports that she does not use drugs. REVIEW OF SYSTEMS:     · General: No weakness or fatigue. No unanticipated weight loss or decreased appetite. No fever or chills. · Eyes: No blurred vision, eye pain or double vision. · Ears: No hearing problems or drainage. No tinnitus. · Throat: No sore throat, problems with swallowing or dysphagia. · Respiratory: No cough, sputum or hemoptysis. No shortness of breath. No pleuritic chest pain. · Cardiovascular: No chest pain, orthopnea or PND. No lower extremity edema. No palpitation. · Gastrointestinal: No problems with swallowing. No abdominal pain or bloating. No nausea or vomiting. No diarrhea or constipation. No GI bleeding. · Genitourinary: No dysuria, hematuria, frequency or urgency. · Musculoskeletal: No muscle aches or pains. No limitation of movement. No back pain. No gait disturbance, No joint complaints. · Dermatologic: No skin rashes or pruritus. No skin lesions or discolorations. · Psychiatric: No depression, anxiety, or stress or signs of schizophrenia. No change in mood or affect. · Hematologic: No history of bleeding tendency. No bruises or ecchymosis. No history of clotting problems. · Infectious disease: No fever, chills or frequent infections. · Endocrine: No polydipsia or polyuria. No temperature intolerance. · Neurologic: No headaches or dizziness. No weakness or numbness of the extremities. No changes in balance, coordination,  memory, mentation, behavior. · Allergic/Immunologic: No nasal congestion or hives. No repeated infections. PHYSICAL EXAM:  The patient is not in acute distress. Vital signs: Blood pressure (!) 165/99, pulse 77, temperature 95.6 °F (35.3 °C), temperature source Temporal, weight 200 lb 8 oz (90.9 kg), not currently breastfeeding.  HEENT:  Eyes are normal. Ears, nose and throat are normal.  Neck: Supple. No lymph node enlargement. No thyroid enlargement. Trachea is centrally located. Chest:  Clear to auscultation. No wheezes or crepitations. Breast: Status post lumpectomy and sentinel lymph node biopsy. Left breast seroma and axillary seroma  No masses. No skin or nippple abnormalities. No palpable lymph nodes. Heart: Regular sinus rhythm. Abdomen: Soft, nontender. No hepatosplenomegaly. No masses. Extremities:  With no edema. Lymph Nodes:  No cervical, axillary or inguinal lymph node enlargement. Neurologic:  Conscious and oriented. No focal neurological deficits. Psychosocial: No depression, anxiety or stress. Skin: No rashes, bruises or ecchymoses. Review of Diagnostic data:   Lab Results   Component Value Date    WBC 8.6 05/10/2021    HGB 15.8 (H) 05/10/2021    HCT 47.6 (H) 05/10/2021    MCV 90.0 05/10/2021     05/10/2021       Chemistry        Component Value Date/Time     05/10/2021 1328    K 3.6 (L) 05/10/2021 1328     05/10/2021 1328    CO2 22 05/10/2021 1328    BUN 13 05/10/2021 1328    CREATININE 0.84 05/10/2021 1328        Component Value Date/Time    CALCIUM 9.6 05/10/2021 1328    ALKPHOS 113 (H) 05/10/2021 1328    AST 12 05/10/2021 1328    ALT 11 05/10/2021 1328    BILITOT 0.33 05/10/2021 1328          MG BREAST SPECIMEN  Narrative: EXAMINATION:  SPECIMEN RADIOGRAPH 5/18/2021    TECHNIQUE:  Single radiograph of the surgical specimen was obtained intraoperatively. VIEWS: Single specimen radiograph obtained on a grid board. COMPARISON:  Pre-lumpectomy left breast mammogram of earlier the same day. HISTORY:  Specimen radiograph post lumpectomy. ORDERING SYSTEM PROVIDED HISTORY: Status  post breast lumpectomy  TECHNOLOGIST PROVIDED HISTORY:  breast cancer    FINDINGS:  Specimen radiograph is submitted intraoperatively. Biopsy clip is in the specimen. Biopsy clip coordinates are D4. Wire tip is intact.   Impression: Specimen is adequate containing the irregular-shaped nodule, wire hook and  clip. Findings called to the operating room at 14:52 on 5/18/2021    RECOMMENDATIONS:  ZW pending histology. MG DIGITAL DIAGNOSTIC W OR WO CAD LEFT  Narrative: EXAMINATION:  ULTRASONOGRAPHIC GUIDED NEEDLE LOCALIZATION OF THE LEFT BREAST    POSTPROCEDURE LEFT MAMMOGRAM    5/18/2021 10:53 am    TECHNIQUE:  Following informed written consent, the patient was brought to the  ultrasonographic suite. Using real-time ultrasonography, appropriate approach  for wire localization of the patient's suspicious solid nodule at 3 o'clock  was selected and marked on the skin surface. The time-out policy was  followed. The left breast was prepped and draped. Using maximal sterile technique,  approach site was infiltrated with 2% xylocaine. Next, 9 cm Ghiatis breast  localization needle with inner wire was advanced to the lesion. Once  positioning of the needle tip within the lesion was ascertained, the needle  was withdrawn, deposit in the hook of the wire within the nodule. Biplanar mammography of the left breast was performed in a separate room to  ascertain wire placement. Hard copy films were marked and sent with the  patient to the operating room. The patient tolerated the procedure well. Sterile dressing was placed over the wire exit point. COMPARISON:  22 April 2021    HISTORY:  ORDERING SYSTEM PROVIDED HISTORY: left breast mass  TECHNOLOGIST PROVIDED HISTORY:  left breast mass    FINDINGS:  The postprocedure left mammogram confirmed good needle/wire localization of  the breast the hook of the localization wire is within the nodule adjacent to  the clip. Impression: Needle/wire localization performed for lumpectomy. Satisfactory wire  placement.     RECOMMENDATIONS:  ZW pending pathology  US PLACE BREAST LOC DEVICE 1ST LESION LEFT  Narrative: EXAMINATION:  ULTRASONOGRAPHIC GUIDED NEEDLE LOCALIZATION OF THE LEFT BREAST    POSTPROCEDURE LEFT MAMMOGRAM    5/18/2021 10:53 am    TECHNIQUE:  Following informed written consent, the patient was brought to the  ultrasonographic suite. Using real-time ultrasonography, appropriate approach  for wire localization of the patient's suspicious solid nodule at 3 o'clock  was selected and marked on the skin surface. The time-out policy was  followed. The left breast was prepped and draped. Using maximal sterile technique,  approach site was infiltrated with 2% xylocaine. Next, 9 cm Ghiatis breast  localization needle with inner wire was advanced to the lesion. Once  positioning of the needle tip within the lesion was ascertained, the needle  was withdrawn, deposit in the hook of the wire within the nodule. Biplanar mammography of the left breast was performed in a separate room to  ascertain wire placement. Hard copy films were marked and sent with the  patient to the operating room. The patient tolerated the procedure well. Sterile dressing was placed over the wire exit point. COMPARISON:  22 April 2021    HISTORY:  ORDERING SYSTEM PROVIDED HISTORY: left breast mass  TECHNOLOGIST PROVIDED HISTORY:  left breast mass    FINDINGS:  The postprocedure left mammogram confirmed good needle/wire localization of  the breast the hook of the localization wire is within the nodule adjacent to  the clip. Impression: Needle/wire localization performed for lumpectomy. Satisfactory wire  placement. RECOMMENDATIONS:  ZW pending pathology  NM LYMPHOSCINTIGRAM  Narrative: EXAMINATION:  LYMPHOSCINTIGRAPHY 05/18/2021    TECHNIQUE:  Topical anesthetic was applied to the left breast around the nipple with  barrier, for approximately 60 minutes prior to the patient arriving in the  nuclear medicine suite. The left breast was prepped and draped. Time-out  policy was adhered tube. Informed written consent was obtained.   Using  maximum aseptic technique, 600 microcuries of technetium 99 M [place] was  injected in 4 divided aliquots intradermally at positions approximating 12,  3, 6 and 9 o'clock around the left nipple. The left breast was massaged to  enhance uptake of the radiotracer. Gamma camera imaging was obtained. Approximately 1155 hours    HISTORY:  Left breast cancer. FINDINGS:  There is no appreciable axillary uptake diagnostic of a sentinel node. Impression: No appreciable uptake outside of the injection sites on scintigraphy  diagnostic of a sentinel node. Lumpectomy of the left breast 5/18/2021:  1.  LEFT BREAST, EXCISIONAL LUMPECTOMY WITH WIRE LOCALIZATION:-   INVASIVE LOBULAR CARCINOMA, 2.4 CM.- THE FINAL SURGICAL MARGINS ARE   NEGATIVE FOR INVASIVE CARCINOMA. - SEE COMMENT. 2.  LEFT BREAST, ADDITIONAL (FINAL) POSTERIOR MARGIN, EXCISION:- THE   FINAL POSTERIOR MARGIN IS NEGATIVE FOR CARCINOMA (7 MM FROM THE    INVASIVE LOBULAR CARCINOMA). 3.  LEFT BREAST, ADDITIONAL (FINAL) INFERIOR MARGIN, EXCISION:-   NEGATIVE FOR NEOPLASIA. 4.  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 IMPRESSION:   Stage T2 a N0 M0 left breast lower outer quadrant invasive lobular carcinoma, ER positive, DE positive, HER-2/janiya not amplified. Sarcoidosis  Multiple comorbidities as listed    PLAN: I again explained to the patient and her companions the nature of breast cancer, staging, prognosis and treatment. The pathology results were reviewed and explained in layman language. Obviously patient had invasive lobular carcinoma with positive hormone receptors and negative HER-2/janiya. She received endocrine therapy with Arimidex before her surgery.   Patient did not have any significant response to the treatment but this could be also related to short duration of treatment with anastrozole since she decided to stop with soon due to side effects. After the lumpectomy she was evaluated by radiation oncology. She declined radiation. She agreed on trying different aromatase inhibitor. She received Femara for about 3 weeks. She decided to stop it due to side effects. She feels much better being off hormonal therapy. Patient is not willing to try anything else. We discussed management and follow-up. She agreed on monitoring. She will be seen in 6 months with labs and diagnostic left mammogram before her visit. Sooner for any problems. Patient's questions were answered to the best of her satisfaction and she verbalized full understanding and agreement.

## 2021-09-08 ENCOUNTER — TELEPHONE (OUTPATIENT)
Dept: ONCOLOGY | Age: 81
End: 2021-09-08

## 2021-09-08 NOTE — TELEPHONE ENCOUNTER
Returned call from Leno. She relates that she stopped taking femara due to weakness in legs. Dr. Jonah Mazariegos is aware and she was not started on anything else. Patient relates that about a week or so after stopping the medication she noticed that she is urinating frequently and is very thirsty. She is asking if it is a result of stopping the medication. She is asking who to call regarding this situation. She relates that her fatigue and weakness in legs may be slightly improved but not much since stopping the femara. I let her know that I am not aware that her stopping medication would cause the new symptoms. I let her know that when we stop these types of medications we expect the patients previous symptoms to improve. I suggested that this would be appropriate to discuss with her family doctor. Leno denies being a diabetic, although she thought about that with these symptoms. I encouraged her to contact her pcp. She has lab orders and follow up for December this year. I let her know I recommend she contact the office now and not wait. She is agreeable.  8/21. Called and spoke with Leno 9/8/21. I let her know I would like her to follow up with her pcp with her symptoms. She relates she will call them today. She relates that she did not mention but she has lost about 20 lbs. She relates she is eating healthier, but her appetite is really not very good. She will notify her pcp. I will update Dr. Jonah Mazariegos as well.

## 2021-09-20 ENCOUNTER — TELEPHONE (OUTPATIENT)
Dept: ONCOLOGY | Age: 81
End: 2021-09-20

## 2021-09-20 NOTE — TELEPHONE ENCOUNTER
J[facility], phone picked up, but no one answered. I called back and left message for pt on google assistant. Alex Getting know that this is Flory Arango her nurse navigator and I wanted to check on her regarding her increased thirst, fatigue and see if she saw or talked to her pcp. I asked her to call me back.

## 2021-09-22 ENCOUNTER — TELEPHONE (OUTPATIENT)
Dept: ONCOLOGY | Age: 81
End: 2021-09-22

## 2021-10-07 ENCOUNTER — TELEPHONE (OUTPATIENT)
Dept: OBGYN CLINIC | Age: 81
End: 2021-10-07

## 2021-10-07 NOTE — TELEPHONE ENCOUNTER
She used some hydrocortisone last time which helped. Is she able to try that again? I don't think I can fit anyone in tomorrow. Discharge or just itching?

## 2021-10-07 NOTE — TELEPHONE ENCOUNTER
Pt has extreme vaginal itching. No appt slots available Friday.     Pt scheduled for Tuesday oct 12 @ 2pm but would like to be seen sooner if possible

## 2021-10-12 ENCOUNTER — OFFICE VISIT (OUTPATIENT)
Dept: OBGYN CLINIC | Age: 81
End: 2021-10-12
Payer: MEDICARE

## 2021-10-12 VITALS
HEIGHT: 68 IN | DIASTOLIC BLOOD PRESSURE: 84 MMHG | BODY MASS INDEX: 28.34 KG/M2 | WEIGHT: 187 LBS | SYSTOLIC BLOOD PRESSURE: 130 MMHG

## 2021-10-12 DIAGNOSIS — N89.8 VAGINAL ITCHING: Primary | ICD-10-CM

## 2021-10-12 DIAGNOSIS — B37.2 SKIN YEAST INFECTION: ICD-10-CM

## 2021-10-12 PROCEDURE — 99213 OFFICE O/P EST LOW 20 MIN: CPT | Performed by: CLINICAL NURSE SPECIALIST

## 2021-10-12 PROCEDURE — G8400 PT W/DXA NO RESULTS DOC: HCPCS | Performed by: CLINICAL NURSE SPECIALIST

## 2021-10-12 PROCEDURE — 4040F PNEUMOC VAC/ADMIN/RCVD: CPT | Performed by: CLINICAL NURSE SPECIALIST

## 2021-10-12 PROCEDURE — 1123F ACP DISCUSS/DSCN MKR DOCD: CPT | Performed by: CLINICAL NURSE SPECIALIST

## 2021-10-12 PROCEDURE — G8427 DOCREV CUR MEDS BY ELIG CLIN: HCPCS | Performed by: CLINICAL NURSE SPECIALIST

## 2021-10-12 PROCEDURE — 1090F PRES/ABSN URINE INCON ASSESS: CPT | Performed by: CLINICAL NURSE SPECIALIST

## 2021-10-12 PROCEDURE — G8484 FLU IMMUNIZE NO ADMIN: HCPCS | Performed by: CLINICAL NURSE SPECIALIST

## 2021-10-12 PROCEDURE — 1036F TOBACCO NON-USER: CPT | Performed by: CLINICAL NURSE SPECIALIST

## 2021-10-12 PROCEDURE — G8417 CALC BMI ABV UP PARAM F/U: HCPCS | Performed by: CLINICAL NURSE SPECIALIST

## 2021-10-12 RX ORDER — FLUCONAZOLE 150 MG/1
TABLET ORAL
Qty: 2 TABLET | Refills: 0 | Status: SHIPPED | OUTPATIENT
Start: 2021-10-12

## 2021-10-12 RX ORDER — GLIPIZIDE 5 MG/1
TABLET ORAL
COMMUNITY
Start: 2021-10-07

## 2021-10-12 RX ORDER — NYSTATIN 100000 U/G
CREAM TOPICAL
Qty: 30 G | Refills: 1 | Status: SHIPPED | OUTPATIENT
Start: 2021-10-12

## 2021-10-12 ASSESSMENT — ENCOUNTER SYMPTOMS
GASTROINTESTINAL NEGATIVE: 1
RESPIRATORY NEGATIVE: 1
ALLERGIC/IMMUNOLOGIC NEGATIVE: 1
EYES NEGATIVE: 1

## 2021-10-12 NOTE — PROGRESS NOTES
Subjective:      Patient ID:  Petra Kurtz is a [de-identified] y.o. female who presents for   Chief Complaint   Patient presents with    Vaginal Itching     saw pcp thursday - was prescibed new diabetic medication glucotrol, believes the change caused the itching       HPI    Patient is an [de-identified] yo female who presents for vaginal itching. Patient reports that she  Recently changed her diabetic medication and then she began with the vaginal itching. Review of Systems   Constitutional: Negative for chills and fever. HENT: Negative. Eyes: Negative. Respiratory: Negative. Cardiovascular: Negative. Gastrointestinal: Negative. Endocrine: Negative. Genitourinary: Negative for dysuria, menstrual problem and vaginal discharge. Vaginal itching for the past 5 days, blood glucose running high   Musculoskeletal: Negative. Skin: Negative. Allergic/Immunologic: Negative. Neurological: Negative. Hematological: Negative. Psychiatric/Behavioral: Negative. /84 (Site: Right Upper Arm, Position: Sitting, Cuff Size: Large Adult)   Ht 5' 8\" (1.727 m)   Wt 187 lb (84.8 kg)   BMI 28.43 kg/m²    No LMP recorded. Patient has had a hysterectomy.     Family History   Problem Relation Age of Onset    No Known Problems Mother     No Known Problems Father     Other Sister         lumpectomy    Colon Cancer Brother       Past Medical History:   Diagnosis Date    Asthma     Hypertension     Hypothyroid     thyroidectomy    [facility]      Past Surgical History:   Procedure Laterality Date    APPENDECTOMY      BREAST LUMPECTOMY Left     BREAST LUMPECTOMY Left 5/18/2021    LEFT BREAST LUMPECTOMY  WITH WIRE LOCALIZATION @ 12 AND LEFT  SENTINEL NODE BIOPSY @13 performed by Clarissa Weber MD at [address] [place] [address]  01/20/2012        HYSTERECTOMY, TOTAL ABDOMINAL      one ovary preserved    THYROIDECTOMY  2015   [place] [id number] NEEDLE BIOPSY LEFT Left 09/04/2020    Left 3-4:00 position: Invasive lobular carcinoma, ES+/VA+    US BREAST NEEDLE BIOPSY LEFT  9/4/2020    US BREAST NEEDLE BIOPSY LEFT    US GUIDED NEEDLE LOC OF LEFT BREAST Left 5/18/2021    US GUIDED NEEDLE LOC OF LEFT BREAST 5/18/2021 [facility]TRASOUND      Social History     Socioeconomic History    Marital status:      Spouse name: None    Number of children: None    Years of education: None    Highest education level: None   Occupational History    None   Tobacco Use    Smoking status: Former Smoker     Types: Cigarettes    Smokeless tobacco: Never Used   Substance and Sexual Activity    Alcohol use: Yes     Comment: rarely    Drug use: No    Sexual activity: Yes     Partners: Male   Other Topics Concern    None   Social History Narrative    None     Social Determinants of Health     Financial Resource Strain:     Difficulty of Paying Living Expenses:    Food Insecurity:     Worried About Running Out of Food in the Last Year:     [address] in the Last Year:    Transportation Needs:     Lack of Transportation (Medical):  Lack of Transportation (Non-Medical):    Physical Activity:     Days of Exercise per Week:     Minutes of Exercise per Session:    Stress:     Feeling of Stress :    Social Connections:     Frequency of Communication with Friends and Family:     Frequency of Social Gatherings with Friends and Family:     Attends Congregational Services:     Active Member of Clubs or Organizations:     Attends Club or Organization Meetings:     Marital Status:    Intimate Partner Violence:     Fear of Current or Ex-Partner:     Emotionally Abused:     Physically Abused:     Sexually Abused:       Current Outpatient Medications   Medication Sig Dispense Refill    nystatin (MYCOSTATIN) 511079 UNIT/GM cream Apply topically 2 times daily. 30 g 1    fluconazole (DIFLUCAN) 150 MG tablet Take one tablet today and repeat one tablet in 3 days. 2 tablet 0    glipiZIDE (GLUCOTROL) 5 MG tablet       cyanocobalamin 500 MCG tablet Take 500 mcg by mouth daily      albuterol sulfate  (90 Base) MCG/ACT inhaler 2 puffs as needed      umeclidinium-vilanterol (ANORO ELLIPTA) 62.5-25 MCG/INH AEPB inhaler Inhale 1 puff into the lungs daily      letrozole (FEMARA) 2.5 MG tablet Take 1 tablet by mouth daily 30 tablet 3    loratadine (CLARITIN) 10 MG tablet Take 10 mg by mouth daily as needed      Ascorbic Acid (SUPER C COMPLEX PO) Take 1 tablet by mouth daily 500 mg daily      vitamin D (CHOLECALCIFEROL) 1000 UNIT TABS tablet Take 1,000 Units by mouth daily      hydrochlorothiazide (HYDRODIURIL) 25 MG tablet Take 25 mg by mouth daily      atenolol (TENORMIN) 50 MG tablet Take 50 mg by mouth daily      levothyroxine (SYNTHROID) 150 MCG tablet Take 150 mcg by mouth Daily      aspirin 81 MG EC tablet Take 81 mg by mouth daily      NIFEdipine (ADALAT CC) 30 MG extended release tablet Take 30 mg by mouth daily       No current facility-administered medications for this visit. Objective:   Physical Exam  Vitals reviewed. Constitutional:       Appearance: She is well-developed. HENT:      Head: Normocephalic and atraumatic. Eyes:      Conjunctiva/sclera: Conjunctivae normal.   Cardiovascular:      Rate and Rhythm: Normal rate and regular rhythm. Pulmonary:      Effort: Pulmonary effort is normal.      Breath sounds: Normal breath sounds. Abdominal:      General: Bowel sounds are normal.   Genitourinary:     Comments: Labia minora skin yeast noted with some fissures present  Musculoskeletal:         General: Normal range of motion. Cervical back: Normal range of motion and neck supple. Skin:     General: Skin is warm and dry. Neurological:      Mental Status: She is oriented to person, place, and time. Psychiatric:         Behavior: Behavior normal.         Thought Content:  Thought content normal.         Judgment: Judgment

## 2021-10-22 ENCOUNTER — TELEPHONE (OUTPATIENT)
Dept: ONCOLOGY | Age: 81
End: 2021-10-22

## 2021-10-22 NOTE — TELEPHONE ENCOUNTER
ASSISTING  Dino Esteban , RN NAVIGATOR. I CALLED TO CHECK IN WITH PATIENT TO SEE HOW SHE IS DOING. I SPOKE WITH PATIENT AND SHE STATES THAT SHE IS DOING WELL. SHE STATES AT THIS TIME SHE DOESN'T HAVE ANY QUESTIONS OR CONCERNS. I REMINDED HER THAT SHE CAN CALL [facility] AT ANY TIME.   SHE THANKED ME FOR CALLING AND WE HUNG UP.

## 2021-11-02 NOTE — DISCHARGE SUMMARY
[] [facility]) - [facility]  Therapy  [address]  [phone]  F: [phone] [x] [address]  [facility]   [address]  P: [phone]  F: [phone] [] [address] &  Therapy  [address]  P: [phone]  F: [phone] [] [address]  P: [phone]  F: [phone] [] [address]  [facility]   [address]   [place]: [phone]  F: [phone]      Physical Therapy Discharge Note    Date: 2021      Patient: Kelly Beatty  : 1940  MRN: [id number]  Date of initial visit: 2021                Date of final visit: 2021  Physician:  [address] [place] [address] Norman Delaney 10 visits thru 2021)  Medical Diagnosis:  Left breast CA              Rehab Codes: R07.89, l90.5, R60.0,  Onset RTOG:3- lumpectomy               Next 's appt. :      Visit# / total visits: 5/10; Cancels/No Shows: 0 / 0     Type of Cancer breast Er/AR+, HER 2 negative  Location: left breast  Lymph node Involvement  Chemotherapy- no further per patient, \"not interested in\"  Currently Femara  Tried Arimidex - couldn't take \" I was on the couch\"  Radiation - possible , pt states she does not plan on having it, still discussing with physician  Surgery- Dr. Cabrera Days- lumpectomy- 2021    Subjective: (per visit on 2021)  Pain:  []? Yes  [x]? No   Location: \"tightness\"   Pain Rating: (0-10 scale) 0/10     Pain altered Tx:  []? No  []?  Yes  Action:  Comments: Patient states fluid felt like it \"came back quicker this time than after the last visits\"    Objective:  2021 AROM left shoulder                      2021  Flexion                       151 155  abd                              154                              154       Assessment:    Patient reports little to no irritation of left chest area. Full functional use of arm    Prehab visit goal:  1.  Initiate Oncology Rehab to assess any deficits that may limit/prevent upcoming treatments related to cancer diagnosis. Met  2. Screen for risk factors such as limited ROM, strength deficits, balance impairments, tissue restrictions and cardiovascular limitations.    3. Establish a Rehabilitation plan to help cancer survivors maintain/regain quality of life while undergoing cancer treatments. met        STG: (to be met in 5 treatments)  1. ? Pain: Stabilize shoulder pain and ensure optimal management of pain during all ADL's (home, occupation, community and recreation)met 8-2-2021  2. Maintain /optimize AROM of bilateral shoulders for functional activity. met 8-2-2021  3. Increase strength in bilateral shoulders and scapula for good postural stability. 4. Independent with Home Exercise Programs, met 8-2-2021  5. Demonstrate Knowledge of fall prevention met 7-8-2021 low fall risk per Zan So, no intervention needed  6.   Education on signs and symptoms, precautions regarding lymphedema.        7.   Educated breast care exercises. Met 7-8-2021 will continue to review     LTG: (to be met in 10 treatments)  1. Maintain /optimize ROM of bilateral shoulders for functional activity to improve QOL. Met 8-9-2021  Stabilize shoulder pain and ensure optimal management of pain during all ADLS (home, occupation, community and recreation)Met 8-9-2021  5.   Improve tissue mobility of chest wall and axilla to allow for more normal motion and functionMet 8-9-2021  6.   Control/mitigate side effects/late effects of Cancer treatment. Met 8-9-2021     Treatment to Date:  [x] Therapeutic Exercise    [] Modalities:  [] Therapeutic Activity    [] Ultrasound  [] Electrical Stimulation  [] Gait Training     [] Massage       [] Lumbar/Cervical Traction  [] Neuromuscular Re-education [] Cold/hotpack [] Iontophoresis: 4 mg/mL  [x] Instruction in Home Exercise Program                     Dexamethasone Sodium  [x] Manual Therapy             Phosphate 40-80 mAmin  [] Aquatic Therapy                   [] Vasocompression/    [] Other:             Game Ready    Discharge Status:    .    [x] Other:  No further visits scheduled. Did phone and she reported she was exercising. I would be available for her to re initiate therapy in the future should the need arise. Electronically signed by Delwyn Hatchet, PT on 11/2/2021 at 12:01 PM      If you have any questions or concerns, please don't hesitate to call.   Thank you for your referral.

## 2021-11-22 ENCOUNTER — TELEPHONE (OUTPATIENT)
Dept: ONCOLOGY | Age: 81
End: 2021-11-22

## 2021-11-22 DIAGNOSIS — Z17.0 MALIGNANT NEOPLASM OF LOWER-OUTER QUADRANT OF LEFT BREAST OF FEMALE, ESTROGEN RECEPTOR POSITIVE (HCC): Primary | ICD-10-CM

## 2021-11-22 DIAGNOSIS — C50.512 MALIGNANT NEOPLASM OF LOWER-OUTER QUADRANT OF LEFT BREAST OF FEMALE, ESTROGEN RECEPTOR POSITIVE (HCC): Primary | ICD-10-CM

## 2021-11-22 NOTE — TELEPHONE ENCOUNTER
Called Josh Burkett and left message. Let her know I will be signing off from navigation. Wished her well. I let her know to keep my name and contact number and call me as needed in the future. Wished her well, and happy holiday's. Reminded her to call as needed. Referral placed for survivorship.

## 2021-11-23 ENCOUNTER — TELEPHONE (OUTPATIENT)
Dept: ONCOLOGY | Age: 81
End: 2021-11-23

## 2021-11-23 NOTE — TELEPHONE ENCOUNTER
Attempted to call patient in regard to survivorship referral. Patient is currently in another appt and will call back.

## 2022-02-17 ENCOUNTER — HOSPITAL ENCOUNTER (OUTPATIENT)
Dept: ULTRASOUND IMAGING | Age: 82
Discharge: HOME | End: 2022-02-19
Payer: MEDICARE

## 2022-02-17 ENCOUNTER — HOSPITAL ENCOUNTER (OUTPATIENT)
Dept: MAMMOGRAPHY | Age: 82
Discharge: HOME | End: 2022-02-19
Payer: MEDICARE

## 2022-02-17 ENCOUNTER — HOSPITAL ENCOUNTER (OUTPATIENT)
Dept: CT IMAGING | Age: 82
Discharge: HOME | End: 2022-02-19
Payer: MEDICARE

## 2022-02-17 DIAGNOSIS — R92.8 ABNORMAL MAMMOGRAM: ICD-10-CM

## 2022-02-17 DIAGNOSIS — R22.2 MASS IN CHEST: ICD-10-CM

## 2022-02-17 DIAGNOSIS — Z17.0 MALIGNANT NEOPLASM OF LOWER-OUTER QUADRANT OF LEFT BREAST OF FEMALE, ESTROGEN RECEPTOR POSITIVE (HCC): ICD-10-CM

## 2022-02-17 DIAGNOSIS — Z85.3 HX: BREAST CANCER: ICD-10-CM

## 2022-02-17 DIAGNOSIS — C50.512 MALIGNANT NEOPLASM OF LOWER-OUTER QUADRANT OF LEFT BREAST OF FEMALE, ESTROGEN RECEPTOR POSITIVE (HCC): ICD-10-CM

## 2022-02-17 LAB
CREAT SERPL-MCNC: 0.74 MG/DL (ref 0.5–0.9)
GFR AFRICAN AMERICAN: >60 ML/MIN
GFR NON-AFRICAN AMERICAN: >60 ML/MIN
GFR SERPL CREATININE-BSD FRML MDRD: NORMAL ML/MIN/{1.73_M2}
GFR SERPL CREATININE-BSD FRML MDRD: NORMAL ML/MIN/{1.73_M2}

## 2022-02-17 PROCEDURE — 71260 CT THORAX DX C+: CPT

## 2022-02-17 PROCEDURE — 6360000004 HC RX CONTRAST MEDICATION: Performed by: FAMILY MEDICINE

## 2022-02-17 PROCEDURE — 2580000003 HC RX 258: Performed by: FAMILY MEDICINE

## 2022-02-17 PROCEDURE — 36415 COLL VENOUS BLD VENIPUNCTURE: CPT

## 2022-02-17 PROCEDURE — 82565 ASSAY OF CREATININE: CPT

## 2022-02-17 PROCEDURE — G0279 TOMOSYNTHESIS, MAMMO: HCPCS

## 2022-02-17 RX ORDER — 0.9 % SODIUM CHLORIDE 0.9 %
80 INTRAVENOUS SOLUTION INTRAVENOUS ONCE
Status: COMPLETED | OUTPATIENT
Start: 2022-02-17 | End: 2022-02-17

## 2022-02-17 RX ORDER — SODIUM CHLORIDE 0.9 % (FLUSH) 0.9 %
10 SYRINGE (ML) INJECTION PRN
Status: DISCONTINUED | OUTPATIENT
Start: 2022-02-17 | End: 2022-02-20 | Stop reason: HOSPADM

## 2022-02-17 RX ADMIN — IOPAMIDOL 75 ML: 755 INJECTION, SOLUTION INTRAVENOUS at 09:49

## 2022-02-17 RX ADMIN — SODIUM CHLORIDE, PRESERVATIVE FREE 10 ML: 5 INJECTION INTRAVENOUS at 09:49

## 2022-02-17 RX ADMIN — SODIUM CHLORIDE 80 ML: 9 INJECTION, SOLUTION INTRAVENOUS at 09:49

## 2022-02-21 ENCOUNTER — OFFICE VISIT (OUTPATIENT)
Dept: ONCOLOGY | Age: 82
End: 2022-02-21
Payer: MEDICARE

## 2022-02-21 ENCOUNTER — HOSPITAL ENCOUNTER (OUTPATIENT)
Age: 82
Discharge: HOME | End: 2022-02-21
Payer: MEDICARE

## 2022-02-21 ENCOUNTER — TELEPHONE (OUTPATIENT)
Dept: ONCOLOGY | Age: 82
End: 2022-02-21

## 2022-02-21 VITALS
DIASTOLIC BLOOD PRESSURE: 98 MMHG | HEART RATE: 71 BPM | SYSTOLIC BLOOD PRESSURE: 188 MMHG | WEIGHT: 193.2 LBS | BODY MASS INDEX: 29.38 KG/M2 | TEMPERATURE: 97.1 F

## 2022-02-21 DIAGNOSIS — Z17.0 MALIGNANT NEOPLASM OF LOWER-OUTER QUADRANT OF LEFT BREAST OF FEMALE, ESTROGEN RECEPTOR POSITIVE (HCC): ICD-10-CM

## 2022-02-21 DIAGNOSIS — Z17.0 MALIGNANT NEOPLASM OF LOWER-OUTER QUADRANT OF LEFT BREAST OF FEMALE, ESTROGEN RECEPTOR POSITIVE (HCC): Primary | ICD-10-CM

## 2022-02-21 DIAGNOSIS — C50.512 MALIGNANT NEOPLASM OF LOWER-OUTER QUADRANT OF LEFT BREAST OF FEMALE, ESTROGEN RECEPTOR POSITIVE (HCC): ICD-10-CM

## 2022-02-21 DIAGNOSIS — C50.512 MALIGNANT NEOPLASM OF LOWER-OUTER QUADRANT OF LEFT BREAST OF FEMALE, ESTROGEN RECEPTOR POSITIVE (HCC): Primary | ICD-10-CM

## 2022-02-21 LAB
ABSOLUTE EOS #: 0.2 K/UL (ref 0–0.4)
ABSOLUTE IMMATURE GRANULOCYTE: NORMAL K/UL (ref 0–0.3)
ABSOLUTE LYMPH #: 2.3 K/UL (ref 1–4.8)
ABSOLUTE MONO #: 0.9 K/UL (ref 0.1–1.2)
ALBUMIN SERPL-MCNC: 4.1 G/DL (ref 3.5–5.2)
ALBUMIN/GLOBULIN RATIO: 1.1 (ref 1–2.5)
ALP BLD-CCNC: 97 U/L (ref 35–104)
ALT SERPL-CCNC: 5 U/L (ref 5–33)
ANION GAP SERPL CALCULATED.3IONS-SCNC: 11 MMOL/L (ref 9–17)
AST SERPL-CCNC: 13 U/L
BASOPHILS # BLD: 1 % (ref 0–2)
BASOPHILS ABSOLUTE: 0 K/UL (ref 0–0.2)
BILIRUB SERPL-MCNC: 0.32 MG/DL (ref 0.3–1.2)
BUN BLDV-MCNC: 14 MG/DL (ref 8–23)
BUN/CREAT BLD: ABNORMAL (ref 9–20)
CALCIUM SERPL-MCNC: 9.8 MG/DL (ref 8.6–10.4)
CHLORIDE BLD-SCNC: 102 MMOL/L (ref 98–107)
CO2: 24 MMOL/L (ref 20–31)
CREAT SERPL-MCNC: 0.65 MG/DL (ref 0.5–0.9)
DIFFERENTIAL TYPE: NORMAL
EOSINOPHILS RELATIVE PERCENT: 3 % (ref 1–4)
GFR AFRICAN AMERICAN: >60 ML/MIN
GFR NON-AFRICAN AMERICAN: >60 ML/MIN
GFR SERPL CREATININE-BSD FRML MDRD: ABNORMAL ML/MIN/{1.73_M2}
GFR SERPL CREATININE-BSD FRML MDRD: ABNORMAL ML/MIN/{1.73_M2}
GLUCOSE BLD-MCNC: 115 MG/DL (ref 70–99)
HCT VFR BLD CALC: 45.2 % (ref 36–46)
HEMOGLOBIN: 15.2 G/DL (ref 12–16)
IMMATURE GRANULOCYTES: NORMAL %
LYMPHOCYTES # BLD: 24 % (ref 24–44)
MCH RBC QN AUTO: 30.4 PG (ref 26–34)
MCHC RBC AUTO-ENTMCNC: 33.6 G/DL (ref 31–37)
MCV RBC AUTO: 90.3 FL (ref 80–100)
MONOCYTES # BLD: 10 % (ref 2–11)
NRBC AUTOMATED: NORMAL PER 100 WBC
PDW BLD-RTO: 12.7 % (ref 12.5–15.4)
PLATELET # BLD: 334 K/UL (ref 140–450)
PLATELET ESTIMATE: NORMAL
PMV BLD AUTO: 9.4 FL (ref 6–12)
POTASSIUM SERPL-SCNC: 3.6 MMOL/L (ref 3.7–5.3)
RBC # BLD: 5.01 M/UL (ref 4–5.2)
RBC # BLD: NORMAL 10*6/UL
SEG NEUTROPHILS: 62 % (ref 36–66)
SEGMENTED NEUTROPHILS ABSOLUTE COUNT: 6.2 K/UL (ref 1.8–7.7)
SODIUM BLD-SCNC: 137 MMOL/L (ref 135–144)
TOTAL PROTEIN: 7.8 G/DL (ref 6.4–8.3)
WBC # BLD: 9.6 K/UL (ref 3.5–11)
WBC # BLD: NORMAL 10*3/UL

## 2022-02-21 PROCEDURE — G8427 DOCREV CUR MEDS BY ELIG CLIN: HCPCS | Performed by: INTERNAL MEDICINE

## 2022-02-21 PROCEDURE — 4040F PNEUMOC VAC/ADMIN/RCVD: CPT | Performed by: INTERNAL MEDICINE

## 2022-02-21 PROCEDURE — 1090F PRES/ABSN URINE INCON ASSESS: CPT | Performed by: INTERNAL MEDICINE

## 2022-02-21 PROCEDURE — G8400 PT W/DXA NO RESULTS DOC: HCPCS | Performed by: INTERNAL MEDICINE

## 2022-02-21 PROCEDURE — 36415 COLL VENOUS BLD VENIPUNCTURE: CPT

## 2022-02-21 PROCEDURE — 99214 OFFICE O/P EST MOD 30 MIN: CPT | Performed by: INTERNAL MEDICINE

## 2022-02-21 PROCEDURE — 85025 COMPLETE CBC W/AUTO DIFF WBC: CPT

## 2022-02-21 PROCEDURE — G8417 CALC BMI ABV UP PARAM F/U: HCPCS | Performed by: INTERNAL MEDICINE

## 2022-02-21 PROCEDURE — G8484 FLU IMMUNIZE NO ADMIN: HCPCS | Performed by: INTERNAL MEDICINE

## 2022-02-21 PROCEDURE — 99211 OFF/OP EST MAY X REQ PHY/QHP: CPT | Performed by: INTERNAL MEDICINE

## 2022-02-21 PROCEDURE — 1123F ACP DISCUSS/DSCN MKR DOCD: CPT | Performed by: INTERNAL MEDICINE

## 2022-02-21 PROCEDURE — 1036F TOBACCO NON-USER: CPT | Performed by: INTERNAL MEDICINE

## 2022-02-21 PROCEDURE — 80053 COMPREHEN METABOLIC PANEL: CPT

## 2022-02-21 NOTE — TELEPHONE ENCOUNTER
RV 6 months with [facility], CMP before RV    RV scheduled 8/22/22 @ 2pm    PT was given AVS and an appt schedule    Electronically signed by Venancio Arredondo on 2/21/2022 at 12:52 PM

## 2022-02-22 NOTE — PROGRESS NOTES
_  Chief Complaint   Patient presents with    Follow-up     review status of disease    Discuss Labs     DIAGNOSIS:        Stage T2 a N0 M0 left breast lower outer quadrant invasive lobular carcinoma, ER positive, NH positive, HER-2/janiya not amplified. Sarcoidosis  Multiple comorbidities as listed   CURRENT THERAPY:         Status post left lumpectomy May 18, 2021  Declined radiation therapy  Initially started on Arimidex by her surgeon and she could not tolerate it  Prescribed Femara but she decided to stop it 3 weeks after initiating treatment due to side effects  Declines any further endocrine therapy  BRIEF CASE HISTORY:       Ms. Amanda Bullock is a very pleasant 81 y.o. female with history of multiple comorbidities as listed. The patient had bilateral screening mammogram August 20, 2020. She was noted to have suspicious mass in the left breast.  Diagnostic mammogram and ultrasound and biopsy done September 4, 2020. Patient was noted to have suspicious 1 cm mass in the left breast lower outer quadrant. Biopsy showed invasive lobular carcinoma. ER positive, NH positive, HER-2/janiya not amplified. Patient was evaluated by her surgeon but she did not want to have the surgery due to the corona. She was started on endocrine therapy with Arimidex. Patient started treatment in February 2021 due to increasing side effects. Subsequently she was evaluated again by her surgeon and she had lumpectomy 5/18/2021. Final pathology results showed 2.4 cm invasive lobular carcinoma with clear margins. [place] lymph node biopsy was positive for sarcoidosis. The patient is seen today for further management of her breast cancer. The patient did very well after her lumpectomy without any complication. She had fluid collections. The patient had no symptom preceding her diagnosis of cancer.  No chest pain, shortness of breath, cough, sputum or hemoptysis, no back pain or kylie aches, no weight loss or decreased appetite, no fever or night sweating. She denies feeling any lumps or bumps or enlarged lymph nodes. No headaches, and no other complaints. INTERIM HISTORY:   Patient seen for follow-up breast cancer. After recovering from the surgery she had evaluation by her radiation oncologist.  She declined radiation. She was switched from Arimidex to Femara due to side effects. She continued to have significant side effects including difficulty sleeping and body aches and weight loss so she decided to stop it. She felt much better after stopping the treatment. She is not willing to try any further endocrine therapy. No complaints at the present time. GYNECOLOGICAL HISTORY  Menarche at age 14. Menaupause at age 51 hysterectomy. +0. Age at first full term pregnancy 25. No use of HRT. PAST MEDICAL HISTORY: has a past medical history of Asthma, Breast cancer ([facility] [address] Hypertension, Hypothyroid, and Invasive lobular carcinoma of left breast in [facility]). PAST SURGICAL HISTORY: has a past surgical history that includes Colonoscopy (2012); Breast lumpectomy (Left); Hysterectomy, total abdominal; US BREAST BIOPSY W LOC DEVICE 1ST LESION LEFT (Left, 2020); US BREAST BIOPSY W LOC DEVICE 1ST LESION LEFT (2020); Appendectomy; US PLACE BREAST LOC DEVICE 1ST LESION LEFT (Left, 2021); Breast lumpectomy (Left, 2021); and Thyroidectomy (). CURRENT MEDICATIONS:  has a current medication list which includes the following prescription(s): glipizide, cyanocobalamin, nystatin, fluconazole, albuterol sulfate hfa, loratadine, ascorbic acid, vitamin d, hydrochlorothiazide, atenolol, levothyroxine, aspirin, nifedipine, anoro ellipta, and letrozole. ALLERGIES:  is allergic to sulfa antibiotics. FAMILY HISTORY: sister breast cancer 45s, otherwise negative for any hematological or oncological conditions. SOCIAL HISTORY:  reports that she has quit smoking.  Her smoking use included cigarettes. She has never used smokeless tobacco. She reports current alcohol use. She reports that she does not use drugs. REVIEW OF SYSTEMS:     · General: No weakness or fatigue. No unanticipated weight loss or decreased appetite. No fever or chills. · Eyes: No blurred vision, eye pain or double vision. · Ears: No hearing problems or drainage. No tinnitus. · Throat: No sore throat, problems with swallowing or dysphagia. · Respiratory: No cough, sputum or hemoptysis. No shortness of breath. No pleuritic chest pain. · Cardiovascular: No chest pain, orthopnea or PND. No lower extremity edema. No palpitation. · Gastrointestinal: No problems with swallowing. No abdominal pain or bloating. No nausea or vomiting. No diarrhea or constipation. No GI bleeding. · Genitourinary: No dysuria, hematuria, frequency or urgency. · Musculoskeletal: No muscle aches or pains. No limitation of movement. No back pain. No gait disturbance, No joint complaints. · Dermatologic: No skin rashes or pruritus. No skin lesions or discolorations. · Psychiatric: No depression, anxiety, or stress or signs of schizophrenia. No change in mood or affect. · Hematologic: No history of bleeding tendency. No bruises or ecchymosis. No history of clotting problems. · Infectious disease: No fever, chills or frequent infections. · Endocrine: No polydipsia or polyuria. No temperature intolerance. · Neurologic: No headaches or dizziness. No weakness or numbness of the extremities. No changes in balance, coordination,  memory, mentation, behavior. · Allergic/Immunologic: No nasal congestion or hives. No repeated infections. PHYSICAL EXAM:  The patient is not in acute distress. Vital signs: Blood pressure (!) 188/98, pulse 71, temperature 97.1 °F (36.2 °C), temperature source Temporal, weight 193 lb 3.2 oz (87.6 kg), not currently breastfeeding. HEENT:  Eyes are normal. Ears, nose and throat are normal.  Neck: Supple. No lymph node enlargement. No thyroid enlargement. Trachea is centrally located. Chest:  Clear to auscultation. No wheezes or crepitations. Breast: Status post lumpectomy and sentinel lymph node biopsy. Left breast seroma and axillary seroma  No masses. No skin or nippple abnormalities. No palpable lymph nodes. Heart: Regular sinus rhythm. Abdomen: Soft, nontender. No hepatosplenomegaly. No masses. Extremities:  With no edema. Lymph Nodes:  No cervical, axillary or inguinal lymph node enlargement. Neurologic:  Conscious and oriented. No focal neurological deficits. Psychosocial: No depression, anxiety or stress. Skin: No rashes, bruises or ecchymoses. Review of Diagnostic data:   Lab Results   Component Value Date    WBC 9.6 02/21/2022    HGB 15.2 02/21/2022    HCT 45.2 02/21/2022    MCV 90.3 02/21/2022     02/21/2022       Chemistry        Component Value Date/Time     02/21/2022 1206    K 3.6 (L) 02/21/2022 1206     02/21/2022 1206    CO2 24 02/21/2022 1206    BUN 14 02/21/2022 1206    CREATININE 0.65 02/21/2022 1206        Component Value Date/Time    CALCIUM 9.8 02/21/2022 1206    ALKPHOS 97 02/21/2022 1206    AST 13 02/21/2022 1206    ALT 5 02/21/2022 1206    BILITOT 0.32 02/21/2022 1206          CT CHEST W CONTRAST  Narrative: EXAMINATION:  CT OF THE CHEST WITH CONTRAST 2/17/2022 9:39 am    TECHNIQUE:  CT of the chest was performed with the administration of intravenous  contrast. Multiplanar reformatted images are provided for review. Dose  modulation, iterative reconstruction, and/or weight based adjustment of the  mA/kV was utilized to reduce the radiation dose to as low as reasonably  achievable.     COMPARISON:  01/16/2015    HISTORY:  ORDERING SYSTEM PROVIDED HISTORY: Mass in chest  TECHNOLOGIST PROVIDED HISTORY:  STAT Creatinine as needed:->Yes  Reason for Exam: Sarcoidosis follow up    FINDINGS:  Mediastinum: Coronary artery calcifications are a marker of positive, HER-2/janiya not amplified. Sarcoidosis  Multiple comorbidities as listed    PLAN: I again explained to the patient and her companions the nature of breast cancer, staging, prognosis and treatment. The pathology results were reviewed and explained in layman language. Obviously patient had invasive lobular carcinoma with positive hormone receptors and negative HER-2/janiya. She received endocrine therapy with Arimidex before her surgery. Patient did not have any significant response to the treatment but this could be also related to short duration of treatment with anastrozole since she decided to stop with soon due to side effects. After the lumpectomy she was evaluated by radiation oncology. She declined radiation. She agreed on trying different aromatase inhibitor. She received Femara for about 3 weeks. She decided to stop it due to side effects. She feels much better being off hormonal therapy. Patient is not willing to try anything else. We discussed management and follow-up. CT chest and mammogram are fine. She will be seen in 6 months with labs before her visit. Sooner for any problems. Patient's questions were answered to the best of her satisfaction and she verbalized full understanding and agreement.

## 2022-02-25 NOTE — PROGRESS NOTES
[address] [place] [address].  Phone: [phone]  Fax: [phone]       Patient name:           Keenan Castro  MRN:   [id number]  YOB: 1940  PCP:                           Elkin Scales MD  Oncologist:                  Dr. Kishore Wyatt MD  Surgeon:                     Dr. Sarmad Guo MD                           Reason for Visit:   Keenan Castro presents alone for survivorship visit upon completion of treatment for breast cancer. Cancer Diagnosis/Stage:   Invasive lobular carcinoma of left breast (9/4/2020)  Cancer Staging  Staging form: Breast, AJCC 8th Edition  - Pathologic stage from 5/19/2021: Stage IA (pT2, pN0(i+)(sn), cM0, G1, ER+, MI+, HER2-) - Signed by Kenya Rangel MD on 6/11/2021    Oncologic Treatment(s):   Arimidex (September 2020-Deferred surgery due to COVID-19 per pt request )   Left breast lumpectomy with sentinel lymph node biopsy (5/18/2021)   Femara x 3 weeks (pt stopped due to side effects and declines any further endocrine therapy)    Declined radiation therapy    Summary of Case/History:   Keenan Castro is a very pleasant 81 y.o. y/o female who has a history of invasive lobular carcinoma of the left breast. She had bilateral screening mammogram August 20, 2020. She was noted to have suspicious mass in the left breast.  Diagnostic mammogram and ultrasound and biopsy done September 4, 2020. Patient was noted to have suspicious 1 cm mass in the left breast lower outer quadrant. Biopsy showed invasive lobular carcinoma. ER positive, MI positive, HER-2/janiya not amplified. She was evaluated by her surgeon but she did not want to have the surgery due to the pandemic. She was started on endocrine therapy with Arimidex. She stopped treatment in February 2021 due to increasing side effects. Subsequently she was evaluated again by her surgeon and she had lumpectomy 5/18/2021.   Final pathology results showed 2.4 cm invasive lobular carcinoma with clear margins. [place] lymph node biopsy was positive for sarcoidosis. She was then referred to medical and radiation oncology for further evaluation and treatment. She had initial consult with medical (Dr. Kelsey Kay) and radiation oncology (Dr. Sonia Corbett) on 6/11/2021. A treatment plan was discussed per Dr. Kelsey Kay and noted \"The pathology results were reviewed and explained in layman language. Obviously patient had invasive lobular carcinoma with positive hormone receptors and negative HER-2/janiya. She received endocrine therapy with Arimidex before her surgery. Patient did not have any significant response to the treatment but this could be also related to short duration of treatment with anastrozole since she decided to stop with soon due to side effects. I discussed options of treatment in the adjuvant setting. Patient is clear about her wishes of not to have any chemotherapy treatment. So I do not see a need for Oncotype DX. I discussed endocrine therapy with the patient. She is reluctant to take it due to her bad experience with the side effects related to Arimidex. After lengthy discussion she agreed on trying a different aromatase inhibitor so we will prescribe Femara. Benefits and side effects were explained. We will monitor bone density and she will take vitamin D and calcium. \" Dr. Sonia Corbett did recommend radiation therapy; however, Laina Alfonso ultimately declined. During follow up with Dr. Kelsey Kay on 8/16/2021 it was noted \" She received Femara for about 3 weeks. She decided to stop it due to side effects. She feels much better being off hormonal therapy. Patient is not willing to try anything else. We discussed management and follow-up. She agreed on monitoring. She will be seen in 6 months with labs and diagnostic left mammogram before her visit. Sooner for any problems. \"     Diagnostic left mammogram done 2/17/2022 was fluconazole, albuterol sulfate hfa, anoro ellipta, letrozole, loratadine, ascorbic acid, vitamin d, hydrochlorothiazide, atenolol, levothyroxine, aspirin, and nifedipine. Family History:  Family History   Problem Relation Age of Onset    No Known Problems Mother     No Known Problems Father     Other Sister         lumpectomy    Colon Cancer Brother        Social History:   reports that she has quit smoking. Her smoking use included cigarettes. She has never used smokeless tobacco. She reports current alcohol use. She reports that she does not use drugs. Review of Systems   Constitutional: Negative for chills, diaphoresis, fatigue, fever and unexpected weight change. HENT: Negative for tinnitus and trouble swallowing. Eyes: Negative for visual disturbance. Respiratory: Positive for shortness of breath. Negative for apnea and cough. Cardiovascular: Negative for chest pain and leg swelling. Gastrointestinal: Negative for abdominal pain, constipation, diarrhea, nausea and vomiting. Endocrine: Negative for cold intolerance and heat intolerance. Genitourinary: Negative for difficulty urinating, dysuria, hematuria, urgency, vaginal bleeding and vaginal discharge. Vaginal dryness   Musculoskeletal: Positive for arthralgias. Negative for back pain and gait problem. Skin: Negative for color change, rash and wound. Neurological: Positive for numbness. Negative for dizziness, speech difficulty, weakness, light-headedness and headaches. Occasional neuropathy to bilateral fingertips   Hematological: Negative for adenopathy. Psychiatric/Behavioral: Negative for confusion, decreased concentration and suicidal ideas. OBJECTIVE:  Vital Signs:BP (!) 173/79   Pulse 69   Resp 16   Wt 192 lb 9.6 oz (87.4 kg)   SpO2 97%   BMI 29.28 kg/m²     Physical Exam  Vitals reviewed. Constitutional:       General: She is not in acute distress. Appearance: Normal appearance.  She is not ill-appearing. HENT:      Head: Normocephalic and atraumatic. Right Ear: Tympanic membrane normal.      Left Ear: Tympanic membrane normal.   Eyes:      Extraocular Movements: Extraocular movements intact. Conjunctiva/sclera: Conjunctivae normal.      Pupils: Pupils are equal, round, and reactive to light. Neck:      Vascular: No carotid bruit. Cardiovascular:      Rate and Rhythm: Normal rate and regular rhythm. Pulses: Normal pulses. Heart sounds: Normal heart sounds. Pulmonary:      Effort: No respiratory distress. Breath sounds: Normal breath sounds. Chest:   Breasts:      Right: No supraclavicular adenopathy. Left: No supraclavicular adenopathy. Abdominal:      General: Abdomen is flat. Bowel sounds are normal.      Palpations: Abdomen is soft. Musculoskeletal:         General: Normal range of motion. Cervical back: Normal range of motion and neck supple. Right lower leg: No edema. Left lower leg: No edema. Lymphadenopathy:      Cervical: No cervical adenopathy. Upper Body:      Right upper body: No supraclavicular adenopathy. Left upper body: No supraclavicular adenopathy. Skin:     General: Skin is warm and dry. Capillary Refill: Capillary refill takes less than 2 seconds. Neurological:      General: No focal deficit present. Mental Status: She is alert and oriented to person, place, and time. Motor: No weakness. Coordination: Coordination is intact. Gait: Gait is intact. Psychiatric:         Mood and Affect: Mood and affect normal.         Speech: Speech normal.         Behavior: Behavior normal. Behavior is cooperative. Thought Content: Thought content normal.         Cognition and Memory: Cognition normal.         Judgment: Judgment normal.         DATA     2/17/2022 CT Chest   Impression   1.  Worsening bilateral reticulonodular opacities likely related to the known   sarcoidosis, but given the history of malignancy, bears attention on the next   imaging cycle. 2. Unchanged mediastinal and hilar adenopathy due to the granulomatous   disease. 2/17/2022 Diagnostic Left Mammogram  Impression   No mammographic findings of malignancy.       BI-RADS 2       BIRADS:   BIRADS - CATEGORY 2       Benign Findings.  Annual screening mammogram recommended, as desired.       OVERALL ASSESSMENT - BENIGN         Assessment/Plan:  1. Lobular carcinoma of left breast ([address]  · Continue scheduled follow-up for breast cancer surveillance as discussed. · Dr. Dennys Madera (medical oncologist): 8/22/2022  Mammogram: Every 12 months (first mammogram 6-12 months after radiation) per NCCN Guidelines  Last left sided mammogram done: 2/17/2022  · Survivorship Care Plan/Treatment summary extensively reviewed. Refer to Survivorship Care Plan/Treatment summary for details. · Extensive education provided on potential long/late term effects of treatment, signs/symptoms of recurrence to report, follow-up care plan, healthy lifestyle promotion, and available local and national resources. · Continue to follow with PCP for screening of new primary cancers and management of non-cancer related problems/diagnoses. · Next appt: As scheduled      2. Vaginal dryness  · As discussed, make an appointment with your gynecologist to discuss further  · [address] ESTROGEN/PROGESTERONE AS YOUR CANCER IS HORMONE POSITIVE         Electronically signed by MARY Peña CNP          I spent 67 minutes face to face with patient and greater than 50% of time was spent on survivorship education .

## 2022-03-02 ENCOUNTER — OFFICE VISIT (OUTPATIENT)
Dept: ONCOLOGY | Age: 82
End: 2022-03-02
Payer: MEDICARE

## 2022-03-02 VITALS
HEART RATE: 69 BPM | SYSTOLIC BLOOD PRESSURE: 173 MMHG | WEIGHT: 192.6 LBS | DIASTOLIC BLOOD PRESSURE: 79 MMHG | BODY MASS INDEX: 29.28 KG/M2 | RESPIRATION RATE: 16 BRPM | OXYGEN SATURATION: 97 %

## 2022-03-02 DIAGNOSIS — N89.8 VAGINAL DRYNESS: ICD-10-CM

## 2022-03-02 DIAGNOSIS — C50.912 LOBULAR CARCINOMA OF LEFT BREAST (HCC): Primary | ICD-10-CM

## 2022-03-02 PROCEDURE — G8417 CALC BMI ABV UP PARAM F/U: HCPCS | Performed by: NURSE PRACTITIONER

## 2022-03-02 PROCEDURE — 99215 OFFICE O/P EST HI 40 MIN: CPT | Performed by: NURSE PRACTITIONER

## 2022-03-02 PROCEDURE — G8427 DOCREV CUR MEDS BY ELIG CLIN: HCPCS | Performed by: NURSE PRACTITIONER

## 2022-03-02 PROCEDURE — G8400 PT W/DXA NO RESULTS DOC: HCPCS | Performed by: NURSE PRACTITIONER

## 2022-03-02 PROCEDURE — 1036F TOBACCO NON-USER: CPT | Performed by: NURSE PRACTITIONER

## 2022-03-02 PROCEDURE — 4040F PNEUMOC VAC/ADMIN/RCVD: CPT | Performed by: NURSE PRACTITIONER

## 2022-03-02 PROCEDURE — 1090F PRES/ABSN URINE INCON ASSESS: CPT | Performed by: NURSE PRACTITIONER

## 2022-03-02 PROCEDURE — 1123F ACP DISCUSS/DSCN MKR DOCD: CPT | Performed by: NURSE PRACTITIONER

## 2022-03-02 PROCEDURE — G8484 FLU IMMUNIZE NO ADMIN: HCPCS | Performed by: NURSE PRACTITIONER

## 2022-03-02 ASSESSMENT — ENCOUNTER SYMPTOMS
VOMITING: 0
BACK PAIN: 0
COLOR CHANGE: 0
SHORTNESS OF BREATH: 1
NAUSEA: 0
ABDOMINAL PAIN: 0
TROUBLE SWALLOWING: 0
APNEA: 0
COUGH: 0
DIARRHEA: 0
CONSTIPATION: 0

## 2022-03-04 ENCOUNTER — TELEPHONE (OUTPATIENT)
Dept: OBGYN CLINIC | Age: 82
End: 2022-03-04

## 2022-03-04 NOTE — TELEPHONE ENCOUNTER
Pt called she has been recently diagnosed with breast cancer she is having some issues with vaginal dryness and she is wondering if there is something she can use that does not have estrogen please advise

## 2022-03-04 NOTE — TELEPHONE ENCOUNTER
If she hasn't tried coconut oil yet, that would be the first thing I would start with. Can find right by cooking oils. Can use QD.

## 2022-03-07 ENCOUNTER — TELEPHONE (OUTPATIENT)
Dept: ONCOLOGY | Age: 82
End: 2022-03-07

## 2022-03-07 NOTE — TELEPHONE ENCOUNTER
received referral from [facility]] Nurse stating patient had questions about billing.  called and left a message encouraging her to call at her earliest convenience.

## 2022-03-08 ENCOUNTER — TELEPHONE (OUTPATIENT)
Dept: ONCOLOGY | Age: 82
End: 2022-03-08

## 2022-03-08 NOTE — TELEPHONE ENCOUNTER
Patient returned call. Patient reports she is confused on bills she has recently received because she has been up to date with payments. Patient's chart shows credit on account.  reaching out to billing to discuss.

## 2022-08-19 DIAGNOSIS — Z17.0 MALIGNANT NEOPLASM OF LOWER-OUTER QUADRANT OF LEFT BREAST OF FEMALE, ESTROGEN RECEPTOR POSITIVE (HCC): Primary | ICD-10-CM

## 2022-08-19 DIAGNOSIS — C50.512 MALIGNANT NEOPLASM OF LOWER-OUTER QUADRANT OF LEFT BREAST OF FEMALE, ESTROGEN RECEPTOR POSITIVE (HCC): Primary | ICD-10-CM

## 2022-08-22 ENCOUNTER — TELEPHONE (OUTPATIENT)
Dept: ONCOLOGY | Age: 82
End: 2022-08-22

## 2022-08-22 ENCOUNTER — OFFICE VISIT (OUTPATIENT)
Dept: ONCOLOGY | Age: 82
End: 2022-08-22
Payer: MEDICARE

## 2022-08-22 ENCOUNTER — HOSPITAL ENCOUNTER (OUTPATIENT)
Age: 82
Discharge: HOME | End: 2022-08-22
Payer: MEDICARE

## 2022-08-22 VITALS
SYSTOLIC BLOOD PRESSURE: 174 MMHG | HEART RATE: 71 BPM | BODY MASS INDEX: 30.62 KG/M2 | DIASTOLIC BLOOD PRESSURE: 69 MMHG | TEMPERATURE: 96.9 F | WEIGHT: 201.4 LBS

## 2022-08-22 DIAGNOSIS — C50.512 MALIGNANT NEOPLASM OF LOWER-OUTER QUADRANT OF LEFT BREAST OF FEMALE, ESTROGEN RECEPTOR POSITIVE (HCC): Primary | ICD-10-CM

## 2022-08-22 DIAGNOSIS — Z17.0 MALIGNANT NEOPLASM OF LOWER-OUTER QUADRANT OF LEFT BREAST OF FEMALE, ESTROGEN RECEPTOR POSITIVE (HCC): Primary | ICD-10-CM

## 2022-08-22 DIAGNOSIS — Z12.31 SCREENING MAMMOGRAM FOR HIGH-RISK PATIENT: ICD-10-CM

## 2022-08-22 DIAGNOSIS — Z17.0 MALIGNANT NEOPLASM OF LOWER-OUTER QUADRANT OF LEFT BREAST OF FEMALE, ESTROGEN RECEPTOR POSITIVE (HCC): ICD-10-CM

## 2022-08-22 DIAGNOSIS — C50.512 MALIGNANT NEOPLASM OF LOWER-OUTER QUADRANT OF LEFT BREAST OF FEMALE, ESTROGEN RECEPTOR POSITIVE (HCC): ICD-10-CM

## 2022-08-22 LAB
ABSOLUTE EOS #: 0.3 K/UL (ref 0–0.4)
ABSOLUTE LYMPH #: 2.5 K/UL (ref 1–4.8)
ABSOLUTE MONO #: 0.7 K/UL (ref 0.1–1.2)
ALBUMIN SERPL-MCNC: 4.7 G/DL (ref 3.5–5.2)
ALBUMIN/GLOBULIN RATIO: 1.5 (ref 1–2.5)
ALP BLD-CCNC: 97 U/L (ref 35–104)
ALT SERPL-CCNC: 11 U/L (ref 5–33)
ANION GAP SERPL CALCULATED.3IONS-SCNC: 12 MMOL/L (ref 9–17)
AST SERPL-CCNC: 13 U/L
BASOPHILS # BLD: 1 % (ref 0–2)
BASOPHILS ABSOLUTE: 0.1 K/UL (ref 0–0.2)
BILIRUB SERPL-MCNC: 0.36 MG/DL (ref 0.3–1.2)
BUN BLDV-MCNC: 9 MG/DL (ref 8–23)
CALCIUM SERPL-MCNC: 9.8 MG/DL (ref 8.6–10.4)
CHLORIDE BLD-SCNC: 101 MMOL/L (ref 98–107)
CO2: 25 MMOL/L (ref 20–31)
CREAT SERPL-MCNC: 0.7 MG/DL (ref 0.5–0.9)
EOSINOPHILS RELATIVE PERCENT: 4 % (ref 1–4)
GFR AFRICAN AMERICAN: >60 ML/MIN
GFR NON-AFRICAN AMERICAN: >60 ML/MIN
GFR SERPL CREATININE-BSD FRML MDRD: ABNORMAL ML/MIN/{1.73_M2}
GLUCOSE BLD-MCNC: 107 MG/DL (ref 70–99)
HCT VFR BLD CALC: 47.2 % (ref 36–46)
HEMOGLOBIN: 15.6 G/DL (ref 12–16)
LYMPHOCYTES # BLD: 30 % (ref 24–44)
MCH RBC QN AUTO: 30.1 PG (ref 26–34)
MCHC RBC AUTO-ENTMCNC: 33.2 G/DL (ref 31–37)
MCV RBC AUTO: 90.7 FL (ref 80–100)
MONOCYTES # BLD: 9 % (ref 2–11)
PDW BLD-RTO: 13.1 % (ref 12.5–15.4)
PLATELET # BLD: 306 K/UL (ref 140–450)
PMV BLD AUTO: 9.4 FL (ref 6–12)
POTASSIUM SERPL-SCNC: 3.6 MMOL/L (ref 3.7–5.3)
RBC # BLD: 5.2 M/UL (ref 4–5.2)
SEG NEUTROPHILS: 56 % (ref 36–66)
SEGMENTED NEUTROPHILS ABSOLUTE COUNT: 4.8 K/UL (ref 1.8–7.7)
SODIUM BLD-SCNC: 138 MMOL/L (ref 135–144)
TOTAL PROTEIN: 7.8 G/DL (ref 6.4–8.3)
WBC # BLD: 8.4 K/UL (ref 3.5–11)

## 2022-08-22 PROCEDURE — 1090F PRES/ABSN URINE INCON ASSESS: CPT | Performed by: INTERNAL MEDICINE

## 2022-08-22 PROCEDURE — 1123F ACP DISCUSS/DSCN MKR DOCD: CPT | Performed by: INTERNAL MEDICINE

## 2022-08-22 PROCEDURE — 99214 OFFICE O/P EST MOD 30 MIN: CPT | Performed by: INTERNAL MEDICINE

## 2022-08-22 PROCEDURE — G8417 CALC BMI ABV UP PARAM F/U: HCPCS | Performed by: INTERNAL MEDICINE

## 2022-08-22 PROCEDURE — 36415 COLL VENOUS BLD VENIPUNCTURE: CPT

## 2022-08-22 PROCEDURE — 85025 COMPLETE CBC W/AUTO DIFF WBC: CPT

## 2022-08-22 PROCEDURE — G8400 PT W/DXA NO RESULTS DOC: HCPCS | Performed by: INTERNAL MEDICINE

## 2022-08-22 PROCEDURE — G8427 DOCREV CUR MEDS BY ELIG CLIN: HCPCS | Performed by: INTERNAL MEDICINE

## 2022-08-22 PROCEDURE — 80053 COMPREHEN METABOLIC PANEL: CPT

## 2022-08-22 PROCEDURE — 99211 OFF/OP EST MAY X REQ PHY/QHP: CPT | Performed by: INTERNAL MEDICINE

## 2022-08-22 PROCEDURE — 1036F TOBACCO NON-USER: CPT | Performed by: INTERNAL MEDICINE

## 2022-08-22 NOTE — PATIENT INSTRUCTIONS
RV 6 months with [facility], CMP before RV  Mammogram after 2/17/23   Please a copy of labs from today to patient

## 2022-08-22 NOTE — PROGRESS NOTES
_  Chief Complaint   Patient presents with    Follow-up     Review status of disease    Discuss Labs    Other     Replenze a vaginal lubricant that's supposed to be estrogen free I cant use estrogen free      DIAGNOSIS:        Stage T2 a N0 M0 left breast lower outer quadrant invasive lobular carcinoma, ER positive, IL positive, HER-2/janiya not amplified. Sarcoidosis  Multiple comorbidities as listed   CURRENT THERAPY:         Status post left lumpectomy May 18, 2021  Declined radiation therapy  Initially started on Arimidex by her surgeon and she could not tolerate it  Prescribed Femara but she decided to stop it 3 weeks after initiating treatment due to side effects  Declines any further endocrine therapy  BRIEF CASE HISTORY:       Ms. Jo Schreiber is a very pleasant 81 y.o. female with history of multiple comorbidities as listed. The patient had bilateral screening mammogram August 20, 2020. She was noted to have suspicious mass in the left breast.  Diagnostic mammogram and ultrasound and biopsy done September 4, 2020. Patient was noted to have suspicious 1 cm mass in the left breast lower outer quadrant. Biopsy showed invasive lobular carcinoma. ER positive, IL positive, HER-2/janiya not amplified. Patient was evaluated by her surgeon but she did not want to have the surgery due to the corona. She was started on endocrine therapy with Arimidex. Patient started treatment in February 2021 due to increasing side effects. Subsequently she was evaluated again by her surgeon and she had lumpectomy 5/18/2021. Final pathology results showed 2.4 cm invasive lobular carcinoma with clear margins. Kerkhoven lymph node biopsy was positive for sarcoidosis. The patient is seen today for further management of her breast cancer. The patient did very well after her lumpectomy without any complication. She had fluid collections. The patient had no symptom preceding her diagnosis of cancer.  No chest pain, HEENT:  Eyes are normal. Ears, nose and throat are normal.  Neck: Supple. No lymph node enlargement. No thyroid enlargement. Trachea is centrally located. Chest:  Clear to auscultation. No wheezes or crepitations. Breast: Status post lumpectomy and sentinel lymph node biopsy. Left breast seroma and axillary seroma  No masses. No skin or nippple abnormalities. No palpable lymph nodes. Heart: Regular sinus rhythm. Abdomen: Soft, nontender. No hepatosplenomegaly. No masses. Extremities:  With no edema. Lymph Nodes:  No cervical, axillary or inguinal lymph node enlargement. Neurologic:  Conscious and oriented. No focal neurological deficits. Psychosocial: No depression, anxiety or stress. Skin: No rashes, bruises or ecchymoses. Review of Diagnostic data:   Lab Results   Component Value Date    WBC 8.4 08/22/2022    HGB 15.6 08/22/2022    HCT 47.2 (H) 08/22/2022    MCV 90.7 08/22/2022     08/22/2022       Chemistry        Component Value Date/Time     08/22/2022 1324    K 3.6 (L) 08/22/2022 1324     08/22/2022 1324    CO2 25 08/22/2022 1324    BUN 9 08/22/2022 1324    CREATININE 0.70 08/22/2022 1324        Component Value Date/Time    CALCIUM 9.8 08/22/2022 1324    ALKPHOS 97 08/22/2022 1324    AST 13 08/22/2022 1324    ALT 11 08/22/2022 1324    BILITOT 0.36 08/22/2022 1324          MG DELMIS DIGITAL DIAGNOSTIC UNILATERAL LEFT  Narrative: EXAMINATION:  DIAGNOSTIC DIGITAL LEFT BREAST MAMMOGRAM WITH TOMOSYNTHESIS, 2/17/2022 9:22 am    TECHNIQUE:  Diagnostic mammography of the left breast was performed with tomosynthesis. 2D standard and 3D tomosynthesis combination imaging performed through the  left breast.  Computer aided detection was utilized in the interpretation of  this exam.    Views: CC and MLO views of the left breast.  3D tomosynthesis was performed.     COMPARISON:  Mammogram dated 05/18/2021, 04/22/2021, 09/04/2020    HISTORY:  ORDERING SYSTEM PROVIDED HISTORY: HX: breast cancer    Personal history of left breast cancer with lumpectomy in May 2021. Patient  declined any additional treatment. FINDINGS:  There are scattered areas of fibroglandular density. There has been interval  lumpectomy in the outer central left breast.  No suspicious mass,  architectural distortion, or calcification. Impression: No mammographic findings of malignancy. BI-RADS 2    BIRADS:  BIRADS - CATEGORY 2    Benign Findings. Annual screening mammogram recommended, as desired. OVERALL ASSESSMENT - BENIGN    A letter of notification will be sent to the patient regarding the results. The [employer] recommends annual mammograms for women 40  years and older. Lumpectomy of the left breast 5/18/2021:  1. LEFT BREAST, EXCISIONAL LUMPECTOMY WITH WIRE LOCALIZATION:-   INVASIVE LOBULAR CARCINOMA, 2.4 CM.- THE FINAL SURGICAL MARGINS ARE   NEGATIVE FOR INVASIVE CARCINOMA. - SEE COMMENT. 2.  LEFT BREAST, ADDITIONAL (FINAL) POSTERIOR MARGIN, EXCISION:- THE   FINAL POSTERIOR MARGIN IS NEGATIVE FOR CARCINOMA (7 MM FROM THE    INVASIVE LOBULAR CARCINOMA). 3.  LEFT BREAST, ADDITIONAL (FINAL) INFERIOR MARGIN, EXCISION:-   NEGATIVE FOR NEOPLASIA. 4.  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 IMPRESSION:   Stage T2 a N0 M0 left breast lower outer quadrant invasive lobular carcinoma, ER positive, LA positive, HER-2/janiya not amplified. Sarcoidosis  Multiple comorbidities as listed    PLAN: I again explained to the patient and her companions the nature of breast cancer, staging, prognosis and treatment. The pathology results were reviewed and explained in layman language. Obviously patient had invasive lobular carcinoma with positive hormone receptors and negative HER-2/janiya.   She received endocrine therapy with Arimidex before her surgery. Patient did not have any significant response to the treatment but this could be also related to short duration of treatment with anastrozole since she decided to stop with soon due to side effects. After the lumpectomy she was evaluated by radiation oncology. She declined radiation. She agreed on trying different aromatase inhibitor. She received Femara for about 3 weeks. She decided to stop it due to side effects. She feels much better being off hormonal therapy. Patient is not willing to try anything else. We discussed management and follow-up. CT chest and mammogram are fine. Next mammogram after February 17, 2023. She will be seen in 6 months with labs before her visit. Sooner for any problems. Patient's questions were answered to the best of her satisfaction and she verbalized full understanding and agreement.

## 2023-03-20 ENCOUNTER — HOSPITAL ENCOUNTER (OUTPATIENT)
Dept: MAMMOGRAPHY | Age: 83
Discharge: HOME | End: 2023-03-22
Payer: MEDICARE

## 2023-03-20 DIAGNOSIS — Z17.0 MALIGNANT NEOPLASM OF LOWER-OUTER QUADRANT OF LEFT BREAST OF FEMALE, ESTROGEN RECEPTOR POSITIVE (HCC): ICD-10-CM

## 2023-03-20 DIAGNOSIS — C50.512 MALIGNANT NEOPLASM OF LOWER-OUTER QUADRANT OF LEFT BREAST OF FEMALE, ESTROGEN RECEPTOR POSITIVE (HCC): ICD-10-CM

## 2023-03-20 DIAGNOSIS — Z12.31 SCREENING MAMMOGRAM FOR HIGH-RISK PATIENT: ICD-10-CM

## 2023-03-20 PROCEDURE — 77063 BREAST TOMOSYNTHESIS BI: CPT

## 2023-03-30 ENCOUNTER — HOSPITAL ENCOUNTER (OUTPATIENT)
Dept: MAMMOGRAPHY | Age: 83
Discharge: HOME | End: 2023-04-01
Payer: MEDICARE

## 2023-03-30 ENCOUNTER — HOSPITAL ENCOUNTER (OUTPATIENT)
Dept: ULTRASOUND IMAGING | Age: 83
Discharge: HOME | End: 2023-04-01
Payer: MEDICARE

## 2023-03-30 DIAGNOSIS — R92.8 ABNORMAL MAMMOGRAM: ICD-10-CM

## 2023-03-30 DIAGNOSIS — R92.8 ABNORMAL MAMMOGRAM: Primary | ICD-10-CM

## 2023-03-30 PROCEDURE — G0279 TOMOSYNTHESIS, MAMMO: HCPCS

## 2023-03-30 PROCEDURE — 76642 ULTRASOUND BREAST LIMITED: CPT

## 2023-03-31 DIAGNOSIS — Z17.0 MALIGNANT NEOPLASM OF LOWER-OUTER QUADRANT OF LEFT BREAST OF FEMALE, ESTROGEN RECEPTOR POSITIVE (HCC): Primary | ICD-10-CM

## 2023-03-31 DIAGNOSIS — C50.512 MALIGNANT NEOPLASM OF LOWER-OUTER QUADRANT OF LEFT BREAST OF FEMALE, ESTROGEN RECEPTOR POSITIVE (HCC): Primary | ICD-10-CM

## 2023-04-03 ENCOUNTER — TELEPHONE (OUTPATIENT)
Dept: ONCOLOGY | Age: 83
End: 2023-04-03

## 2023-04-03 ENCOUNTER — HOSPITAL ENCOUNTER (OUTPATIENT)
Age: 83
Discharge: HOME | End: 2023-04-03
Payer: MEDICARE

## 2023-04-03 ENCOUNTER — HOSPITAL ENCOUNTER (OUTPATIENT)
Dept: ULTRASOUND IMAGING | Age: 83
Discharge: HOME | End: 2023-04-05
Payer: MEDICARE

## 2023-04-03 ENCOUNTER — HOSPITAL ENCOUNTER (OUTPATIENT)
Dept: MAMMOGRAPHY | Age: 83
Discharge: HOME | End: 2023-04-05
Payer: MEDICARE

## 2023-04-03 DIAGNOSIS — N63.20 MASS OF LEFT BREAST, UNSPECIFIED QUADRANT: ICD-10-CM

## 2023-04-03 DIAGNOSIS — Z17.0 MALIGNANT NEOPLASM OF LOWER-OUTER QUADRANT OF LEFT BREAST OF FEMALE, ESTROGEN RECEPTOR POSITIVE (HCC): ICD-10-CM

## 2023-04-03 DIAGNOSIS — Z98.890 STATUS POST BREAST BIOPSY: ICD-10-CM

## 2023-04-03 DIAGNOSIS — R92.8 ABNORMAL MAMMOGRAM: ICD-10-CM

## 2023-04-03 DIAGNOSIS — C50.512 MALIGNANT NEOPLASM OF LOWER-OUTER QUADRANT OF LEFT BREAST OF FEMALE, ESTROGEN RECEPTOR POSITIVE (HCC): ICD-10-CM

## 2023-04-03 LAB
ABSOLUTE EOS #: 0.18 K/UL (ref 0–0.4)
ABSOLUTE LYMPH #: 1.9 K/UL (ref 1–4.8)
ABSOLUTE MONO #: 0.64 K/UL (ref 0.1–1.2)
ALBUMIN SERPL-MCNC: 3.9 G/DL (ref 3.5–5.2)
ALBUMIN/GLOBULIN RATIO: 1.1 (ref 1–2.5)
ALP SERPL-CCNC: 88 U/L (ref 35–104)
ALT SERPL-CCNC: 11 U/L (ref 5–33)
ANION GAP SERPL CALCULATED.3IONS-SCNC: 14 MMOL/L (ref 9–17)
AST SERPL-CCNC: 17 U/L
BASOPHILS # BLD: 0 % (ref 0–2)
BASOPHILS ABSOLUTE: 0.02 K/UL (ref 0–0.2)
BILIRUB SERPL-MCNC: 0.4 MG/DL (ref 0.3–1.2)
BUN SERPL-MCNC: 11 MG/DL (ref 8–23)
CALCIUM SERPL-MCNC: 9.7 MG/DL (ref 8.6–10.4)
CHLORIDE SERPL-SCNC: 104 MMOL/L (ref 98–107)
CO2 SERPL-SCNC: 22 MMOL/L (ref 20–31)
CREAT SERPL-MCNC: 0.66 MG/DL (ref 0.5–0.9)
EOSINOPHILS RELATIVE PERCENT: 3 % (ref 1–4)
GFR SERPL CREATININE-BSD FRML MDRD: >60 ML/MIN/1.73M2
GLUCOSE SERPL-MCNC: 140 MG/DL (ref 70–99)
HCT VFR BLD AUTO: 45.2 % (ref 36–46)
HGB BLD-MCNC: 15.5 G/DL (ref 12–16)
LYMPHOCYTES # BLD: 28 % (ref 24–44)
MCH RBC QN AUTO: 30.5 PG (ref 26–34)
MCHC RBC AUTO-ENTMCNC: 34.3 G/DL (ref 31–37)
MCV RBC AUTO: 89 FL (ref 80–100)
MONOCYTES # BLD: 10 % (ref 2–11)
PDW BLD-RTO: 13 % (ref 12.5–15.4)
PLATELET # BLD AUTO: 291 K/UL (ref 140–450)
PMV BLD AUTO: 11.2 FL (ref 8–14)
POTASSIUM SERPL-SCNC: 4.4 MMOL/L (ref 3.7–5.3)
PROT SERPL-MCNC: 7.6 G/DL (ref 6.4–8.3)
RBC # BLD: 5.08 M/UL (ref 4–5.2)
SEG NEUTROPHILS: 59 % (ref 36–66)
SEGMENTED NEUTROPHILS ABSOLUTE COUNT: 4 K/UL (ref 1.8–7.7)
SODIUM SERPL-SCNC: 140 MMOL/L (ref 135–144)
WBC # BLD AUTO: 6.7 K/UL (ref 3.5–11)

## 2023-04-03 PROCEDURE — 88312 SPECIAL STAINS GROUP 1: CPT

## 2023-04-03 PROCEDURE — 85025 COMPLETE CBC W/AUTO DIFF WBC: CPT

## 2023-04-03 PROCEDURE — 88342 IMHCHEM/IMCYTCHM 1ST ANTB: CPT

## 2023-04-03 PROCEDURE — 88377 M/PHMTRC ALYS ISHQUANT/SEMIQ: CPT

## 2023-04-03 PROCEDURE — 19083 BX BREAST 1ST LESION US IMAG: CPT

## 2023-04-03 PROCEDURE — 19084 BX BREAST ADD LESION US IMAG: CPT

## 2023-04-03 PROCEDURE — 36415 COLL VENOUS BLD VENIPUNCTURE: CPT

## 2023-04-03 PROCEDURE — 80053 COMPREHEN METABOLIC PANEL: CPT

## 2023-04-03 PROCEDURE — 77065 DX MAMMO INCL CAD UNI: CPT

## 2023-04-03 PROCEDURE — 88360 TUMOR IMMUNOHISTOCHEM/MANUAL: CPT

## 2023-04-03 PROCEDURE — 2500000003 HC RX 250 WO HCPCS

## 2023-04-03 PROCEDURE — 88305 TISSUE EXAM BY PATHOLOGIST: CPT

## 2023-04-03 NOTE — TELEPHONE ENCOUNTER
AVS from 4/3/23    Had breast biopsy today  Call with results  RV 4-6 months      After results from bx are in we will call with results    Rv scheduled for 8/7 @ 1:15 pm     Pt was given AVS and appointment schedule    Electronically signed by Peter Beal on 4/3/2023 at 2:33 PM

## 2023-04-05 LAB — SURGICAL PATHOLOGY REPORT: NORMAL

## 2023-05-03 ENCOUNTER — TELEPHONE (OUTPATIENT)
Dept: ONCOLOGY | Age: 83
End: 2023-05-03

## 2023-05-03 NOTE — TELEPHONE ENCOUNTER
Late Entry    Name: Chantell Gray  : 1940  MRN: [id number]    Oncology Navigation Follow-Up Note    Contact Type:  Telephone    Notes:Writer spoke with who called to say that there was a death in her family so she is unable to make Dr Henrique Burnett consult on  Writer updated Iqra Sanchez and pt was scheduled for next available appt which was . Pt is ok with this date/time. Pt denied further barriers and any unanswered questions at this time. Pt has writer's contact information and encouraged to contact writer with any concerns. Will continue to follow.      Electronically signed by Leonardo Siegel RN on 5/3/2023 at 1:55 PM

## 2023-05-30 ENCOUNTER — TELEPHONE (OUTPATIENT)
Dept: ONCOLOGY | Age: 83
End: 2023-05-30

## 2023-05-30 NOTE — TELEPHONE ENCOUNTER
Name: Jose L Juárez  : 1940  MRN: [id number]    Oncology Navigation Follow-Up Note    Contact Type:  Telephone    Notes:Writer spoke with pt for ONN f/u. Pt reports she is doing ok. Voiced that she keeps questioning why her cancer came back. Writer provided support. Pt is Quaker, writer will send referral to noman for additional support services. She is scheduled for surgery on 6/15. Pt will need a post op f/u with Dr Hortencia Espinoza. Currently pt is scheduled for MO f/u in August. Writer will send message to [facility] schedulers requesting pt's appt be adjusted. Pt denied further barriers and any unanswered questions at this time. Pt has writer's contact information and encouraged to contact writer with any concerns. Will continue to follow.      Electronically signed by Sarah Hilario RN on 2023 at 10:29 AM

## 2023-06-02 ENCOUNTER — TELEPHONE (OUTPATIENT)
Dept: SPIRITUAL SERVICES | Age: 83
End: 2023-06-02

## 2023-06-02 NOTE — TELEPHONE ENCOUNTER
Writer spoke with Patient on the phone.     Electronically signed by Shannan Cote [address] [place] [address]  [phone]  6/2/2023  3:18 PM

## 2023-06-02 NOTE — FLOWSHEET NOTE
SPIRITUAL CARE PROGRESS NOTE: Outpatient Oncology Care    Spiritual Assessment: Writer received a referral from Elyse Hendrix RN, to provide emotional support to patient following her diagnosis. Patient shared how she was doing. Patient spoke of how she has come to terms with what is going on and has decided to do what she needs to do. Patient identified her family (children) and friends as what has been helping her cope. She affirmed her belief in a \"Higher Being. \" She acknowledged that she has come through many \"issues\" in her life to equip her to face this one. She took writer's contact information. Intervention: Writer introduced herself and her services. Writer provided supportive presence and active listening. Writer inquired about Pt's coping and needs. Writer asked about Pt's sources of support and strength. Writer offered words of support and encouragement. Writer gave Pt her phone number and encouraged Patient to call her if needed. Outcome: Patient thanked writer for the phone call. Plan: Chaplains will remain available to provide emotional and spiritual support as needed. 06/02/23 1518   Encounter Summary   Service Provided For: Patient   Referral/Consult From: Nurse   Support System Family members; Children;Significant other;Friends/neighbors   Last Encounter  06/02/23   Complexity of Encounter Moderate   Begin Time 1505   End Time  1515   Total Time Calculated 10 min   Encounter    Type Initial Screen/Assessment   Spiritual/Emotional needs   Type Spiritual Support   Assessment/Intervention/Outcome   Assessment Coping;Calm   Intervention Active listening;Explored/Affirmed feelings, thoughts, concerns;Explored Coping Skills/Resources   Outcome Coping;Engaged in conversation;Expressed feelings, needs, and concerns;Expressed Gratitude   Plan and Referrals   Plan/Referrals Continue Support (comment)       Electronically signed by Chelsea Webster, Oncology Outpatient   Spiritual

## 2023-06-09 ENCOUNTER — HOSPITAL ENCOUNTER (OUTPATIENT)
Dept: PREADMISSION TESTING | Age: 83
End: 2023-06-09
Payer: MEDICARE

## 2023-06-09 VITALS
SYSTOLIC BLOOD PRESSURE: 186 MMHG | RESPIRATION RATE: 14 BRPM | OXYGEN SATURATION: 99 % | HEART RATE: 75 BPM | DIASTOLIC BLOOD PRESSURE: 82 MMHG | HEIGHT: 68 IN | BODY MASS INDEX: 30.61 KG/M2 | WEIGHT: 201.94 LBS | TEMPERATURE: 98 F

## 2023-06-09 LAB
ANION GAP SERPL CALCULATED.3IONS-SCNC: 14 MMOL/L (ref 9–17)
BUN SERPL-MCNC: 12 MG/DL (ref 8–23)
BUN/CREAT SERPL: 10 (ref 9–20)
CALCIUM SERPL-MCNC: 9.1 MG/DL (ref 8.6–10.4)
CHLORIDE SERPL-SCNC: 102 MMOL/L (ref 98–107)
CO2 SERPL-SCNC: 25 MMOL/L (ref 20–31)
CREAT SERPL-MCNC: 1.18 MG/DL (ref 0.5–0.9)
EKG ATRIAL RATE: 81 BPM
EKG P AXIS: 67 DEGREES
EKG P-R INTERVAL: 158 MS
EKG Q-T INTERVAL: 390 MS
EKG QRS DURATION: 92 MS
EKG QTC CALCULATION (BAZETT): 453 MS
EKG R AXIS: 29 DEGREES
EKG T AXIS: 61 DEGREES
EKG VENTRICULAR RATE: 81 BPM
ERYTHROCYTE [DISTWIDTH] IN BLOOD BY AUTOMATED COUNT: 12.1 % (ref 11.8–14.4)
GFR SERPL CREATININE-BSD FRML MDRD: 46 ML/MIN/1.73M2
GLUCOSE SERPL-MCNC: 201 MG/DL (ref 70–99)
HCT VFR BLD AUTO: 45.6 % (ref 36.3–47.1)
HGB BLD-MCNC: 15.2 G/DL (ref 11.9–15.1)
MCH RBC QN AUTO: 30.3 PG (ref 25.2–33.5)
MCHC RBC AUTO-ENTMCNC: 33.3 G/DL (ref 28.4–34.8)
MCV RBC AUTO: 90.8 FL (ref 82.6–102.9)
NRBC AUTOMATED: 0 PER 100 WBC
PLATELET # BLD AUTO: 298 K/UL (ref 138–453)
PMV BLD AUTO: 11.3 FL (ref 8.1–13.5)
POTASSIUM SERPL-SCNC: 3.5 MMOL/L (ref 3.7–5.3)
RBC # BLD AUTO: 5.02 M/UL (ref 3.95–5.11)
SODIUM SERPL-SCNC: 141 MMOL/L (ref 135–144)
WBC OTHER # BLD: 7.7 K/UL (ref 3.5–11.3)

## 2023-06-09 PROCEDURE — 80048 BASIC METABOLIC PNL TOTAL CA: CPT

## 2023-06-09 PROCEDURE — 36415 COLL VENOUS BLD VENIPUNCTURE: CPT

## 2023-06-09 PROCEDURE — 85027 COMPLETE CBC AUTOMATED: CPT

## 2023-06-09 RX ORDER — CHLORAL HYDRATE 500 MG
1 CAPSULE ORAL DAILY
COMMUNITY

## 2023-06-09 NOTE — PRE-PROCEDURE INSTRUCTIONS
[facility] Shann[facility]23 at 09:45 AM    Once you enter the hospital lobby, take the elevators to the second floor. Check-In is at the surgery registration desk. Continue to take your home medications as you normally do up to and including the night before surgery with the exception of any blood thinning medications. Please stop any blood thinning medications as directed by your surgeon or prescribing physician. Failure to stop certain medications may interfere with your scheduled surgery. These may include:  Aspirin, Warfarin (Coumadin), Clopidogrel (Plavix), Ibuprofen (Motrin, Advil), Naproxen (Aleve), Meloxicam (Mobic), Celecoxib (Celebrex), Eliquis, Pradaxa, Xarelto, Effient, Fish Oil, Herbal supplements. Stop aspirin & omega 3 at this time. Tylenol is okay to take up until your surgery        Please take the following medication(s) the day of surgery with a small sip of water:  Atenolol,levothyroxine,nifedipine      Please use your inhaler(s) if needed and bring your inhaler(s) from home the day of surgery. PREPARING FOR YOUR SURGERY:     Before surgery, you can play an important role in your own health. Because skin is not sterile, we need to be sure that your skin is as free of germs as possible before surgery by carefully washing before surgery. Preparing or prepping skin before surgery can reduce the risk of a surgical site infection.   Do not shave the area of your body where your surgery will be performed unless you received specific permission from your physician. You will need to shower at home the night before surgery and the morning of surgery with a special soap called chlorhexidine gluconate (CHG*). *Not to be used by people allergic to Chlorhexidine Gluconate (CHG). Following these instructions will help you be sure that your skin is clean before surgery. Instructions on cleaning your skin before surgery:      The night before your

## 2023-06-15 ENCOUNTER — HOSPITAL ENCOUNTER (OUTPATIENT)
Age: 83
Setting detail: OBSERVATION
Discharge: HOME | End: 2023-06-16
Attending: SURGERY | Admitting: SURGERY
Payer: MEDICARE

## 2023-06-15 DIAGNOSIS — C50.912 MALIGNANT NEOPLASM OF LEFT FEMALE BREAST, UNSPECIFIED ESTROGEN RECEPTOR STATUS, UNSPECIFIED SITE OF BREAST (HCC): ICD-10-CM

## 2023-06-15 DIAGNOSIS — G89.18 POST-MASTECTOMY PAIN: Primary | ICD-10-CM

## 2023-06-15 LAB — GLUCOSE BLD-MCNC: 190 MG/DL (ref 65–105)

## 2023-06-15 PROCEDURE — C1889 IMPLANT/INSERT DEVICE, NOC: HCPCS | Performed by: SURGERY

## 2023-06-15 PROCEDURE — 88309 TISSUE EXAM BY PATHOLOGIST: CPT

## 2023-06-15 PROCEDURE — G0378 HOSPITAL OBSERVATION PER HR: HCPCS

## 2023-06-15 PROCEDURE — 3600000012 HC SURGERY LEVEL 2 ADDTL 15MIN: Performed by: SURGERY

## 2023-06-15 PROCEDURE — 7100000000 HC PACU RECOVERY - FIRST 15 MIN: Performed by: SURGERY

## 2023-06-15 PROCEDURE — 88341 IMHCHEM/IMCYTCHM EA ADD ANTB: CPT

## 2023-06-15 PROCEDURE — 7100000001 HC PACU RECOVERY - ADDTL 15 MIN: Performed by: SURGERY

## 2023-06-15 PROCEDURE — 3600000002 HC SURGERY LEVEL 2 BASE: Performed by: SURGERY

## 2023-06-15 PROCEDURE — 88342 IMHCHEM/IMCYTCHM 1ST ANTB: CPT

## 2023-06-15 PROCEDURE — 2709999900 HC NON-CHARGEABLE SUPPLY: Performed by: SURGERY

## 2023-06-15 PROCEDURE — 88377 M/PHMTRC ALYS ISHQUANT/SEMIQ: CPT

## 2023-06-15 PROCEDURE — 2500000003 HC RX 250 WO HCPCS: Performed by: SURGERY

## 2023-06-15 PROCEDURE — 82947 ASSAY GLUCOSE BLOOD QUANT: CPT

## 2023-06-15 PROCEDURE — 3700000000 HC ANESTHESIA ATTENDED CARE: Performed by: SURGERY

## 2023-06-15 PROCEDURE — 3700000001 HC ADD 15 MINUTES (ANESTHESIA): Performed by: SURGERY

## 2023-06-15 PROCEDURE — 6360000002 HC RX W HCPCS: Performed by: ANESTHESIOLOGY

## 2023-06-15 PROCEDURE — 6370000000 HC RX 637 (ALT 250 FOR IP): Performed by: SURGERY

## 2023-06-15 PROCEDURE — 88360 TUMOR IMMUNOHISTOCHEM/MANUAL: CPT

## 2023-06-15 RX ORDER — SODIUM CHLORIDE 0.9 % (FLUSH) 0.9 %
5-40 SYRINGE (ML) INJECTION PRN
Status: DISCONTINUED | OUTPATIENT
Start: 2023-06-15 | End: 2023-06-16 | Stop reason: HOSPADM

## 2023-06-15 RX ORDER — ONDANSETRON 2 MG/ML
4 INJECTION INTRAMUSCULAR; INTRAVENOUS EVERY 6 HOURS PRN
Status: DISCONTINUED | OUTPATIENT
Start: 2023-06-15 | End: 2023-06-16 | Stop reason: HOSPADM

## 2023-06-15 RX ORDER — SODIUM CHLORIDE 9 MG/ML
INJECTION, SOLUTION INTRAVENOUS PRN
Status: DISCONTINUED | OUTPATIENT
Start: 2023-06-15 | End: 2023-06-16 | Stop reason: HOSPADM

## 2023-06-15 RX ORDER — OXYCODONE HYDROCHLORIDE 5 MG/1
10 TABLET ORAL EVERY 4 HOURS PRN
Status: DISCONTINUED | OUTPATIENT
Start: 2023-06-15 | End: 2023-06-16 | Stop reason: HOSPADM

## 2023-06-15 RX ORDER — LIDOCAINE HYDROCHLORIDE 10 MG/ML
1 INJECTION, SOLUTION EPIDURAL; INFILTRATION; INTRACAUDAL; PERINEURAL
Status: DISCONTINUED | OUTPATIENT
Start: 2023-06-16 | End: 2023-06-15 | Stop reason: HOSPADM

## 2023-06-15 RX ORDER — SODIUM CHLORIDE 9 MG/ML
INJECTION, SOLUTION INTRAVENOUS CONTINUOUS
Status: DISCONTINUED | OUTPATIENT
Start: 2023-06-15 | End: 2023-06-16

## 2023-06-15 RX ORDER — ONDANSETRON 2 MG/ML
4 INJECTION INTRAMUSCULAR; INTRAVENOUS
Status: DISCONTINUED | OUTPATIENT
Start: 2023-06-15 | End: 2023-06-15 | Stop reason: HOSPADM

## 2023-06-15 RX ORDER — SODIUM CHLORIDE 0.9 % (FLUSH) 0.9 %
5-40 SYRINGE (ML) INJECTION PRN
Status: DISCONTINUED | OUTPATIENT
Start: 2023-06-15 | End: 2023-06-15 | Stop reason: HOSPADM

## 2023-06-15 RX ORDER — OXYCODONE HYDROCHLORIDE 5 MG/1
5 TABLET ORAL EVERY 4 HOURS PRN
Status: DISCONTINUED | OUTPATIENT
Start: 2023-06-15 | End: 2023-06-16 | Stop reason: HOSPADM

## 2023-06-15 RX ORDER — ONDANSETRON 4 MG/1
4 TABLET, ORALLY DISINTEGRATING ORAL EVERY 8 HOURS PRN
Status: DISCONTINUED | OUTPATIENT
Start: 2023-06-15 | End: 2023-06-16 | Stop reason: HOSPADM

## 2023-06-15 RX ORDER — SODIUM CHLORIDE 0.9 % (FLUSH) 0.9 %
5-40 SYRINGE (ML) INJECTION EVERY 12 HOURS SCHEDULED
Status: DISCONTINUED | OUTPATIENT
Start: 2023-06-15 | End: 2023-06-15 | Stop reason: HOSPADM

## 2023-06-15 RX ORDER — DEXTROSE, SODIUM CHLORIDE, AND POTASSIUM CHLORIDE 5; .45; .15 G/100ML; G/100ML; G/100ML
INJECTION INTRAVENOUS CONTINUOUS
Status: DISCONTINUED | OUTPATIENT
Start: 2023-06-15 | End: 2023-06-16 | Stop reason: HOSPADM

## 2023-06-15 RX ORDER — SENNA AND DOCUSATE SODIUM 50; 8.6 MG/1; MG/1
1 TABLET, FILM COATED ORAL 2 TIMES DAILY
Status: DISCONTINUED | OUTPATIENT
Start: 2023-06-15 | End: 2023-06-16 | Stop reason: HOSPADM

## 2023-06-15 RX ORDER — SODIUM CHLORIDE, SODIUM LACTATE, POTASSIUM CHLORIDE, CALCIUM CHLORIDE 600; 310; 30; 20 MG/100ML; MG/100ML; MG/100ML; MG/100ML
INJECTION, SOLUTION INTRAVENOUS CONTINUOUS
Status: DISCONTINUED | OUTPATIENT
Start: 2023-06-15 | End: 2023-06-16

## 2023-06-15 RX ORDER — ACETAMINOPHEN 325 MG/1
650 TABLET ORAL EVERY 4 HOURS PRN
Status: DISCONTINUED | OUTPATIENT
Start: 2023-06-15 | End: 2023-06-16 | Stop reason: HOSPADM

## 2023-06-15 RX ORDER — SODIUM CHLORIDE 9 MG/ML
INJECTION, SOLUTION INTRAVENOUS PRN
Status: DISCONTINUED | OUTPATIENT
Start: 2023-06-15 | End: 2023-06-15 | Stop reason: HOSPADM

## 2023-06-15 RX ORDER — SODIUM CHLORIDE 0.9 % (FLUSH) 0.9 %
5-40 SYRINGE (ML) INJECTION EVERY 12 HOURS SCHEDULED
Status: DISCONTINUED | OUTPATIENT
Start: 2023-06-15 | End: 2023-06-16 | Stop reason: HOSPADM

## 2023-06-15 RX ORDER — FENTANYL CITRATE 50 UG/ML
25 INJECTION, SOLUTION INTRAMUSCULAR; INTRAVENOUS EVERY 5 MIN PRN
Status: DISCONTINUED | OUTPATIENT
Start: 2023-06-15 | End: 2023-06-15 | Stop reason: HOSPADM

## 2023-06-15 RX ORDER — OXYCODONE HYDROCHLORIDE AND ACETAMINOPHEN 5; 325 MG/1; MG/1
1 TABLET ORAL EVERY 6 HOURS PRN
Qty: 20 TABLET | Refills: 0 | Status: SHIPPED | OUTPATIENT
Start: 2023-06-15 | End: 2023-06-20

## 2023-06-15 RX ORDER — HYDROMORPHONE HYDROCHLORIDE 1 MG/ML
0.5 INJECTION, SOLUTION INTRAMUSCULAR; INTRAVENOUS; SUBCUTANEOUS EVERY 5 MIN PRN
Status: DISCONTINUED | OUTPATIENT
Start: 2023-06-15 | End: 2023-06-15 | Stop reason: HOSPADM

## 2023-06-15 RX ADMIN — POTASSIUM CHLORIDE, DEXTROSE MONOHYDRATE AND SODIUM CHLORIDE: 150; 5; 450 INJECTION, SOLUTION INTRAVENOUS at 18:51

## 2023-06-15 RX ADMIN — DOCUSATE SODIUM 50MG AND SENNOSIDES 8.6MG 1 TABLET: 8.6; 5 TABLET, FILM COATED ORAL at 20:29

## 2023-06-15 RX ADMIN — FENTANYL CITRATE 25 MCG: 50 INJECTION, SOLUTION INTRAMUSCULAR; INTRAVENOUS at 16:54

## 2023-06-15 RX ADMIN — OXYCODONE HYDROCHLORIDE 5 MG: 5 TABLET ORAL at 18:58

## 2023-06-15 ASSESSMENT — PAIN DESCRIPTION - ORIENTATION: ORIENTATION: RIGHT;LEFT

## 2023-06-15 ASSESSMENT — PAIN SCALES - GENERAL
PAINLEVEL_OUTOF10: 6
PAINLEVEL_OUTOF10: 3
PAINLEVEL_OUTOF10: 6

## 2023-06-15 ASSESSMENT — PAIN DESCRIPTION - LOCATION
LOCATION: CHEST
LOCATION: BREAST

## 2023-06-15 ASSESSMENT — PAIN - FUNCTIONAL ASSESSMENT: PAIN_FUNCTIONAL_ASSESSMENT: 0-10

## 2023-06-15 ASSESSMENT — PAIN DESCRIPTION - DESCRIPTORS: DESCRIPTORS: ACHING

## 2023-06-15 NOTE — BRIEF OP NOTE
Brief Postoperative Note      Patient: Anil Beal  YOB: 1940  MRN: [id number]    Date of Procedure: 6/15/2023    Pre-Op Diagnosis Codes:     * Malignant neoplasm of left female breast, unspecified estrogen receptor status, [facility] [C50.912]    Post-Op Diagnosis: Same       Procedure(s):  BILATERAL BREAST MASTECTOMY    Surgeon(s):  Raz Roque DO    Assistant:  Surgical Assistant: Tevin Iglesias  Resident: Ronda Bhagat DO    Anesthesia: General    Estimated Blood Loss (mL): less than 514     Complications: None    Specimens:   ID Type Source Tests Collected by Time Destination   A : RIGHT BREAST. 1 LONG SUTURE LATERAL MARGIN, 2 SHORT SUTURES SUPERIOR MARGIN. Tissue Breast SURGICAL PATHOLOGY Raz Roque DO 6/15/2023 1256    B : LEFT BREAST. 1 LONG SUTURE LATERAL MARGIN, 2 SHORT SUTURES SUPERIOR MARGIN. Tissue Breast SURGICAL PATHOLOGY Raz Roque DO 6/15/2023 1257        Implants:  Implant Name Type Inv.  Item Serial No.  Lot No. LRB No. Used Action   SURGICLOSE MICRO 38SQ CM FISH-SKIN GRAFT FOR SURGICAL USE (POWDER)    [employer] [id number] Bilateral 2 Implanted         Drains:   Closed/Suction Drain Lateral;Right Chest Bulb (Active)       Closed/Suction Drain Lateral;Left Chest Bulb (Active)       Findings: wound class 1, nikky drains placed x2, bilateral simple mastectomies      Electronically signed by Danilo Thompson DO on 6/15/2023 at 4:13 PM

## 2023-06-15 NOTE — DISCHARGE INSTRUCTIONS
Follow these instructions explicitly and record drains as required below. You must complete this 3 times per day, and at any time the drainage bag is more than 1/3 full. You will be required to submit the drain log to Michelle Wyman MD for your patient record. 1.Wash your hands well with anti-bacterial soap. 2.  Strip the drain holding it at the base of the is not pulled out. You must strip. Drain at least 3 times a day if not more to prevent clots forming in the tubing. 3. Open cap on the drain bulb. Pour out drainage into a clean measuring cup. Record the amount of drainage and time of day as indicated. Dispose of drainage in the toilet and flush. 4. Squeeze bulbs tight. Replace cap. Once you have measured your fluid drainage, you must milk or strip the drain tubing. This is done to prevent small clots from blocking fluid flow. To do this, hold the tubing securely at the skin site with one hand. With the other hand, pinch the tubing between your thumb and index finger and apply firm pressure as you strip the tubing towards the bulb. If the tubing and bulb come apart, wipe the ends with alcohol and reconnect. Squeeze the bulb again and replace cap.         Additional Instruction  Always Secure the drain to your clothing so that there is no tension on the drain at the incision site  Do not cut the drains  Keep tubes connected to the bulbs  Check that the bulb is always deflated (or flat)        Caution and Concern  Call the surgeons office if any of the following occur:   A large amount of leakage around the drain  A marked increase in drainage output (double your usual flow)  Increased heat, redness or tenderness around the insertion site        Date Time                 Drain #1 (right)   amount                Drain #2  (left)  amount            Drain #3 (back)  amount

## 2023-06-15 NOTE — PROGRESS NOTES
Pt admitted to room 2105. Oriented to room, call light and bed mechanics. Side rails up x2. Call light within reach. Orders reviewed.

## 2023-06-15 NOTE — H&P
Interval H&P Note    Pt Name: Rodger Riggins  MRN: [id number]  YOB: 1940  Date of evaluation: 6/15/2023      [x] I have reviewed in epic the Surgery Progress Note by Anuja Burger dated 5/22/23 attached below for the Interval History and Physical note. [x] I have examined  Rodger Riggins  There are no changes to the patient who is scheduled for BILATERAL BREAST MASTECTOMY by Maximilian Ta DO for Malignant neoplasm of left female breast, unspecified estrogen receptor st*. The patient denies new health changes, fever, chills, wheezing, cough, increased SOB, chest pain, open sores or wounds. Last ASA 81mg > 2 weeks     Vital signs: BP (!) 181/85   Pulse 73   Temp 97.1 °F (36.2 °C) (Temporal)   Resp 18   Ht 5' 8\" (1.727 m)   Wt 201 lb (91.2 kg)   SpO2 93%   BMI 30.56 kg/m²     Allergies:  Sulfa antibiotics    Medications:    Prior to Admission medications    Medication Sig Start Date End Date Taking?  Authorizing Provider   Omega-3 1000 MG CAPS Take 1 capsule by mouth daily    Historical Provider, MD   cyanocobalamin 500 MCG tablet Take 1 tablet by mouth daily    Historical Provider, MD   albuterol sulfate  (90 Base) MCG/ACT inhaler 2 puffs as needed    Historical Provider, MD   loratadine (CLARITIN) 10 MG tablet Take 1 tablet by mouth daily as needed    Historical Provider, MD   Ascorbic Acid (SUPER C COMPLEX PO) Take 1 tablet by mouth daily 500 mg daily    Historical Provider, MD   vitamin D (CHOLECALCIFEROL) 1000 UNIT TABS tablet Take 1 tablet by mouth daily    Historical Provider, MD   hydrochlorothiazide (HYDRODIURIL) 25 MG tablet Take 1 tablet by mouth daily    Historical Provider, MD   atenolol (TENORMIN) 50 MG tablet Take 1 tablet by mouth daily    Historical Provider, MD   levothyroxine (SYNTHROID) 150 MCG tablet Take 1 tablet by mouth Daily    Historical Provider, MD   aspirin 81 MG EC tablet Take 1 tablet by mouth daily    Historical Provider, MD   NIFEdipine (ADALAT CC) 30

## 2023-06-16 VITALS
HEIGHT: 68 IN | HEART RATE: 67 BPM | WEIGHT: 201 LBS | OXYGEN SATURATION: 92 % | BODY MASS INDEX: 30.46 KG/M2 | DIASTOLIC BLOOD PRESSURE: 60 MMHG | RESPIRATION RATE: 17 BRPM | SYSTOLIC BLOOD PRESSURE: 140 MMHG | TEMPERATURE: 98.2 F

## 2023-06-16 LAB — GLUCOSE BLD-MCNC: 220 MG/DL (ref 65–105)

## 2023-06-16 PROCEDURE — G0378 HOSPITAL OBSERVATION PER HR: HCPCS

## 2023-06-16 PROCEDURE — 6370000000 HC RX 637 (ALT 250 FOR IP): Performed by: SURGERY

## 2023-06-16 PROCEDURE — 82947 ASSAY GLUCOSE BLOOD QUANT: CPT

## 2023-06-16 RX ORDER — ONDANSETRON 4 MG/1
4 TABLET, ORALLY DISINTEGRATING ORAL EVERY 8 HOURS PRN
Qty: 30 TABLET | Refills: 0 | Status: SHIPPED | OUTPATIENT
Start: 2023-06-16 | End: 2023-06-26

## 2023-06-16 RX ADMIN — OXYCODONE HYDROCHLORIDE 5 MG: 5 TABLET ORAL at 14:18

## 2023-06-16 RX ADMIN — OXYCODONE HYDROCHLORIDE 10 MG: 5 TABLET ORAL at 08:23

## 2023-06-16 RX ADMIN — OXYCODONE HYDROCHLORIDE 10 MG: 5 TABLET ORAL at 03:07

## 2023-06-16 RX ADMIN — DOCUSATE SODIUM 50MG AND SENNOSIDES 8.6MG 1 TABLET: 8.6; 5 TABLET, FILM COATED ORAL at 08:23

## 2023-06-16 ASSESSMENT — PAIN - FUNCTIONAL ASSESSMENT: PAIN_FUNCTIONAL_ASSESSMENT: ACTIVITIES ARE NOT PREVENTED

## 2023-06-16 ASSESSMENT — PAIN DESCRIPTION - LOCATION
LOCATION: CHEST
LOCATION: BREAST
LOCATION: CHEST

## 2023-06-16 ASSESSMENT — PAIN DESCRIPTION - ORIENTATION: ORIENTATION: RIGHT;LEFT

## 2023-06-16 ASSESSMENT — PAIN SCALES - GENERAL
PAINLEVEL_OUTOF10: 8
PAINLEVEL_OUTOF10: 7
PAINLEVEL_OUTOF10: 5

## 2023-06-16 ASSESSMENT — PAIN DESCRIPTION - DESCRIPTORS: DESCRIPTORS: ACHING;DISCOMFORT;SORE

## 2023-06-16 NOTE — CARE COORDINATION
Case Management Initial Discharge Plan  Frederic Ashton,             Met with:patient to discuss discharge plans. Information verified: address, contacts, phone number, , insurance Yes  PCP: Isai Cruz MD  Date of last visit: 23    Insurance Provider: [facility]    Discharge Planning    Living Arrangements:   Alone   Support Systems:   family, friends    Home has 1 stories  1 stairs to climb to get into front door, 0 stairs to climb to reach second floor  Location of bedroom/bathroom in home  - main floor    Patient able to perform ADL's:Independent    Current Services (outpatient & in home) none  DME equipment: none  DME provider: N/A    Pharmacy: [facility]     Potential Assistance Needed:   N/A    Patient agreeable to home care: No  [facility] of choice provided:  n/a    Prior SNF/Rehab Placement and Facility: No  Agreeable to SNF/Rehab: No  [facility] of choice provided: n/a   Evaluation: n/a    Expected Discharge date:   23  Patient expects to be discharged to:   home  Follow Up Appointment: Best Day/ Time:      Transportation provider: jori  Transportation arrangements needed for discharge: No    Readmission Risk              Risk of Unplanned Readmission:  0             Does patient have a readmission risk score greater than 14?: No  If yes, follow-up appointment must be made within 7 days of discharge. Goal of Care:       Discharge Plan: Met with patient at bedside. Lives alone, independent and drives. POD # 1 bilateral mastectomies. Has 2 HENRY drains. Pt declines [facility]. Has family to assist if needed. Pt will follow with Dr. Moon Sargent post op. Also follows with Dr. Shaye Wesley in [facility] [place].              Electronically signed by Ryan Bond RN on 23 at 12:16 PM EDT

## 2023-06-16 NOTE — OP NOTE
Operative Note      Patient: Deyvi Arzate  YOB: 1940  MRN: [id number]    Date of Procedure: 6/15/2023    Pre-Op Diagnosis Codes:     * Malignant neoplasm of left female breast, unspecified estrogen receptor status, unspecified site of breast ([facility] [C50.912]    Post-Op Diagnosis: Same       Procedure(s):  BILATERAL BREAST MASTECTOMY    Surgeon(s):  Zane Hale DO    Assistant:   Surgical Assistant: Pranay Vargas  Resident: Cedric Lin DO    Anesthesia: General    Estimated Blood Loss (mL): less than 060     Complications: None    Specimens:   ID Type Source Tests Collected by Time Destination   A : RIGHT BREAST. 1 LONG SUTURE LATERAL MARGIN, 2 SHORT SUTURES SUPERIOR MARGIN. Tissue Breast SURGICAL PATHOLOGY Zane Hale DO 6/15/2023 1256    B : LEFT BREAST. 1 LONG SUTURE LATERAL MARGIN, 2 SHORT SUTURES SUPERIOR MARGIN. Tissue Breast SURGICAL PATHOLOGY Zane Hale DO 6/15/2023 1257        Implants:  Implant Name Type Inv. Item Serial No.  Lot No. LRB No. Used Action   SURGICLOSE MICRO 38SQ CM FISH-SKIN GRAFT FOR SURGICAL USE (POWDER)    [employer] [id number] Bilateral 2 Implanted         Drains:   Closed/Suction Drain Lateral;Right Chest Bulb (Active)   Site Description Clean, dry & intact 06/16/23 0824   Dressing Status Clean, dry & intact 06/16/23 0824   Drainage Appearance Bloody 06/16/23 0824   Drain Status To bulb suction 06/16/23 0824   Output (ml) 50 ml 06/16/23 0923       Closed/Suction Drain Lateral;Left Chest Bulb (Active)   Site Description Clean, dry & intact 06/16/23 0824   Dressing Status Clean, dry & intact 06/16/23 0824   Drainage Appearance Bloody 06/16/23 0824   Drain Status To bulb suction 06/16/23 0824   Output (ml) 70 ml 06/16/23 8417       Findings: wound class 1, nikky drains placed x2, bilateral simple mastectomies      Detailed Description of Procedure: This is a 76-year-old female with history of recurrent left breast carcinoma.   She

## 2023-06-16 NOTE — PROGRESS NOTES
CLINICAL PHARMACY NOTE: MEDS TO BEDS    Total # of Prescriptions Filled: 2   The following medications were delivered to the patient:  Percocet 5-325  Ondansetron 4mg    Additional Documentation:    Pt purchased an otc bottle of stool softener 100mg

## 2023-06-16 NOTE — PROGRESS NOTES
General Surgery:  Daily Progress Note          PATIENT NAME: Yuval Elena     TODAY'S DATE: 6/16/2023, 6:20 AM  SUMMARY: 23-year-old female s/p bilateral mastectomy (6/15/2023). SUBJECTIVE:     Patient seen and examined at the bedside. No acute overnight events. Vital signs within normal limits and stable. Afebrile. Patient is no complaints at this time. States that pain is adequately controlled on the current regimen. Ambulating without issue. Voiding spontaneously. Tolerating regular diet. HENRY drains in place with approximately 150 mL serosanguineous output out of the left drain and 60 mL serosanguineous output from the right drain. Patient states that she feels comfortable discharging home. OBJECTIVE:   VITALS:  BP (!) 116/57   Pulse 72   Temp 97.2 °F (36.2 °C) (Oral)   Resp 16   Ht 5' 8\" (1.727 m)   Wt 201 lb (91.2 kg)   SpO2 94%   BMI 30.56 kg/m²      INTAKE/OUTPUT:      Intake/Output Summary (Last 24 hours) at 6/16/2023 6788  Last data filed at 6/16/2023 0319  Gross per 24 hour   Intake 1290 ml   Output 990 ml   Net 300 ml       PHYSICAL EXAM:  General Appearance: awake, alert, oriented, in no acute distress  HEENT:  Normocephalic, atraumatic, mucus membranes moist   Heart: Heart regular rate and rhythm  Lungs: Normal respiratory effort, no wheezing or stridor. Chest: Surgical bra and dressings in place. Dressings are clean and dry. No evidence of active bleeding. HENRY drain in place bilaterally, draining serosanguineous fluid. Abdomen: Soft, nondistended, nontender. Extremities: No cyanosis, pitting edema, rashes noted. Skin: Skin color, texture, turgor normal. No rashes or lesions. Data:  No new values. Radiology Review:    No new imaging. ASSESSMENT:  Active Hospital Problems    Diagnosis Date Noted    Post-mastectomy pain [G89.18] 06/15/2023       81 y.o. female s/p bilateral mastectomy performed 6/15/2022. Plan:  -Patient seen and evaluated.   History,

## 2023-06-16 NOTE — PLAN OF CARE
Problem: Discharge Planning  Goal: Discharge to home or other facility with appropriate resources  6/16/2023 1818 by Kusum Villaseñor RN  Outcome: Completed  6/16/2023 1312 by Kusum Villaseñor RN  Outcome: Progressing     Problem: Pain  Goal: Verbalizes/displays adequate comfort level or baseline comfort level  Outcome: Completed     Problem: Safety - Adult  Goal: Free from fall injury  Outcome: Completed

## 2023-06-16 NOTE — ACP (ADVANCE CARE PLANNING)
Advance Care Planning     Advance Care Planning Clinical Specialist  Conversation Note      Date of ACP Conversation: 6/16/2023    Conversation Conducted with: Patient with Decision Making Capacity    ACP Clinical Specialist: Joanna Hernández RN    {When Decision Maker makes decisions on behalf of the incapacitated patient: Decision Maker is asked to consider and make decisions based on patient values, known preferences, or best interests. Healthcare Decision Maker:     Current Designated Healthcare Decision Maker:     Primary Decision Maker: Ly Nicole - [phone]  Click here to complete Healthcare Decision Makers including section of the Healthcare Decision Maker Relationship (ie \"Primary\")      Care Preferences    Hospitalization: \"If your health worsens and it becomes clear that your chance of recovery is unlikely, what would your preference be regarding hospitalization? \"    Choice:  [] The patient wants hospitalization. [] The patient prefers comfort-focused treatment without hospitalization. Ventilation: \"If you were in your present state of health and suddenly became very ill and were unable to breathe on your own, what would your preference be about the use of a ventilator (breathing machine) if it were available to you? \"      If the patient would desire the use of ventilator (breathing machine), answer \"yes\". If not, \"no\": yes    \"If your health worsens and it becomes clear that your chance of recovery is unlikely, what would your preference be about the use of a ventilator (breathing machine) if it were available to you? \"     Would the patient desire the use of ventilator (breathing machine)?: No      Resuscitation  \"CPR works best to restart the heart when there is a sudden event, like a heart attack, in someone who is otherwise healthy. Unfortunately, CPR does not typically restart the heart for people who have serious health conditions or who are very sick. \"    \"In the event

## 2023-06-16 NOTE — PROGRESS NOTES
Patient has been up the bathroom via standby assist and a walker. Patient tolerated fine. Patient assisted back to bed.

## 2023-06-16 NOTE — PROGRESS NOTES
The pt was discharged to home. The discharge instructions were given and reviewed with the pt. She was instructed on how to care for and empty her HENRY drains. She was escorted to the exit per wheelchair in stable condition.

## 2023-06-21 ENCOUNTER — TELEPHONE (OUTPATIENT)
Dept: ONCOLOGY | Age: 83
End: 2023-06-21

## 2023-06-21 LAB — SURGICAL PATHOLOGY REPORT: NORMAL

## 2023-06-21 NOTE — TELEPHONE ENCOUNTER
Name: Iris David  : 1940  MRN: [id number]    Oncology Navigation Follow-Up Note    Contact Type:  Telephone    Notes:Writer spoke with pt for ONN post op f/u. She reports that she is doing [place] well\" she reports mild to moderate pains in the morning and she is using medication 1 time a day for this. She discussed upcoming appts. Pathology is still pending, she will f/u with Dr Desiree Patel next week for review of these results. Pt denied further barriers and any unanswered questions at this time. Pt has writer's contact information and encouraged to contact writer with any concerns. Will continue to follow.      Electronically signed by Nanci hSin RN on 2023 at 8:28 AM

## 2023-06-24 NOTE — DISCHARGE SUMMARY
Surgery Discharge Summary     Patient Identification  Diego Tierney is a 81 y.o. female. 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 Alfonso Keyes in 7-10 weeks. See pre-printed instructions in chart and given to patient upon discharge.     Discharge Medications:        Medication List        START taking these medications      ondansetron 4 MG disintegrating tablet  Commonly known as: ZOFRAN-ODT  Take 1 tablet by mouth every 8 hours as needed for Nausea or Vomiting            CONTINUE taking these medications      albuterol sulfate  (90 Base) MCG/ACT inhaler  Commonly known as: PROVENTIL;VENTOLIN;PROAIR     aspirin 81 MG EC tablet     atenolol 50 MG tablet  Commonly known as: TENORMIN     cyanocobalamin 500 MCG tablet     hydroCHLOROthiazide 25 MG tablet  Commonly known as: HYDRODIURIL     levothyroxine 150 MCG tablet  Commonly known as: SYNTHROID     loratadine 10 MG tablet  Commonly known as: CLARITIN     NIFEdipine 30 MG extended release tablet  Commonly known as: ADALAT CC     [place]-3 1000 MG Caps     SUPER C

## 2023-07-07 ENCOUNTER — OFFICE VISIT (OUTPATIENT)
Dept: ONCOLOGY | Age: 83
End: 2023-07-07
Payer: MEDICARE

## 2023-07-07 ENCOUNTER — TELEPHONE (OUTPATIENT)
Dept: ONCOLOGY | Age: 83
End: 2023-07-07

## 2023-07-07 VITALS
RESPIRATION RATE: 18 BRPM | TEMPERATURE: 97.4 F | WEIGHT: 202.7 LBS | HEART RATE: 68 BPM | BODY MASS INDEX: 30.82 KG/M2 | OXYGEN SATURATION: 95 %

## 2023-07-07 DIAGNOSIS — C50.912 LOBULAR CARCINOMA OF LEFT BREAST (HCC): Primary | ICD-10-CM

## 2023-07-07 PROCEDURE — 99214 OFFICE O/P EST MOD 30 MIN: CPT | Performed by: INTERNAL MEDICINE

## 2023-07-07 PROCEDURE — G8417 CALC BMI ABV UP PARAM F/U: HCPCS | Performed by: INTERNAL MEDICINE

## 2023-07-07 PROCEDURE — G8427 DOCREV CUR MEDS BY ELIG CLIN: HCPCS | Performed by: INTERNAL MEDICINE

## 2023-07-07 PROCEDURE — 1123F ACP DISCUSS/DSCN MKR DOCD: CPT | Performed by: INTERNAL MEDICINE

## 2023-07-07 PROCEDURE — 99211 OFF/OP EST MAY X REQ PHY/QHP: CPT | Performed by: INTERNAL MEDICINE

## 2023-07-07 PROCEDURE — 1036F TOBACCO NON-USER: CPT | Performed by: INTERNAL MEDICINE

## 2023-07-07 PROCEDURE — G8400 PT W/DXA NO RESULTS DOC: HCPCS | Performed by: INTERNAL MEDICINE

## 2023-07-07 PROCEDURE — 1090F PRES/ABSN URINE INCON ASSESS: CPT | Performed by: INTERNAL MEDICINE

## 2023-07-07 RX ORDER — EXEMESTANE 25 MG/1
25 TABLET ORAL DAILY
Qty: 30 TABLET | Refills: 4 | Status: SHIPPED | OUTPATIENT
Start: 2023-07-07

## 2023-07-07 NOTE — TELEPHONE ENCOUNTER
AVS From 7/7/23  '    Start Aromasin  RV 3 months      Rv sched for 10/2 @ 11:15 am        Pt didn't check out     Electronically signed by Kameron Sanchez on 7/7/2023 at 10:33 AM

## 2023-07-21 ENCOUNTER — TELEPHONE (OUTPATIENT)
Dept: ONCOLOGY | Age: 83
End: 2023-07-21

## 2023-07-21 NOTE — TELEPHONE ENCOUNTER
Name: Dreama Saint  : 1940  MRN: [id number]    Oncology Navigation Follow-Up Note    Contact Type:  Telephone    Notes:Writer spoke with pt for ONN f/u. She reports that she jut completed her antibiotics and will start Aromasin this weekend. Pt encouraged to reach out to office or navigator if she has any concerns after starting medication. She verbalized understanding. Pt denied further barriers and any unanswered questions at this time. Pt has writer's contact information and encouraged to contact writer with any concerns. Will continue to follow.      Electronically signed by Sal Arthur RN on 2023 at 12:52 PM

## 2023-08-14 ENCOUNTER — TELEPHONE (OUTPATIENT)
Dept: ONCOLOGY | Age: 83
End: 2023-08-14

## 2023-08-14 NOTE — TELEPHONE ENCOUNTER
Name: Antonio Silveira  : 1940  MRN: [id number]    Oncology Navigation Follow-Up Note    Contact Type:  Telephone    Notes:Writer spoke with pt for ONN f/u. She reports she is taking her Aromasin but she is having lingering fatigue with this. She is also reporting tachycardia, this worsens when she lays down. She stated this happened a few times when she was in her 42's. We discussed other symptoms and pt denied any SOB, chest pain, dizziness or palpitations with her racing HR. Writer encouraged pt to contact her PCP for further f/u as this is not a common side effects from her oncology treatment. She has an appt next month and plan to discuss this concerns at that time. Encouraged pt if symptoms worsen then she needs to be seen sooner. She verbalized understanding. Pt denied further barriers and any unanswered questions at this time. Pt has writer's contact information and encouraged to contact writer with any concerns. Will continue to follow.      Electronically signed by Trav Nicholson RN on 2023 at 12:49 PM

## 2023-09-14 ENCOUNTER — TELEPHONE (OUTPATIENT)
Dept: ONCOLOGY | Age: 83
End: 2023-09-14

## 2023-09-14 NOTE — TELEPHONE ENCOUNTER
Name: Benjamin Benjamin  : 1940  MRN: [id number]    Oncology Navigation Follow-Up Note    Contact Type:  Telephone    Notes: Attempted to contact pt for ONN f/u. No answer, VM left encouraging pt to return writer's call with any concerns or barriers they may have. Reminded pt of upcoming [place] appt details on . Provided writer's contact information in message. Will continue to follow.      Electronically signed by Elyse Hendrix RN on 2023 at 11:15 AM

## 2023-09-29 ENCOUNTER — TELEPHONE (OUTPATIENT)
Dept: ONCOLOGY | Age: 83
End: 2023-09-29

## 2023-09-29 NOTE — TELEPHONE ENCOUNTER
Name: Destiny Kidney  : 1940  MRN: [id number]    Oncology Navigation Follow-Up Note    Contact Type:  Medical Oncology    Notes: Writer received call from pt who reports she can not make it in for her [place] appt next Monday due to scheduling conflict. Writer will notify office that pt needs to reschedule.      Electronically signed by Mary Handy RN on 2023 at

## 2023-10-30 ENCOUNTER — TELEPHONE (OUTPATIENT)
Dept: ONCOLOGY | Age: 83
End: 2023-10-30

## 2023-10-30 ENCOUNTER — OFFICE VISIT (OUTPATIENT)
Dept: ONCOLOGY | Age: 83
End: 2023-10-30
Payer: MEDICARE

## 2023-10-30 VITALS
DIASTOLIC BLOOD PRESSURE: 80 MMHG | SYSTOLIC BLOOD PRESSURE: 171 MMHG | HEART RATE: 74 BPM | TEMPERATURE: 97.1 F | BODY MASS INDEX: 30.74 KG/M2 | WEIGHT: 202.2 LBS | RESPIRATION RATE: 18 BRPM

## 2023-10-30 DIAGNOSIS — C50.912 LOBULAR CARCINOMA OF LEFT BREAST (HCC): Primary | ICD-10-CM

## 2023-10-30 DIAGNOSIS — Z17.0 MALIGNANT NEOPLASM OF LOWER-OUTER QUADRANT OF LEFT BREAST OF FEMALE, ESTROGEN RECEPTOR POSITIVE (HCC): ICD-10-CM

## 2023-10-30 DIAGNOSIS — C50.512 MALIGNANT NEOPLASM OF LOWER-OUTER QUADRANT OF LEFT BREAST OF FEMALE, ESTROGEN RECEPTOR POSITIVE (HCC): ICD-10-CM

## 2023-10-30 PROCEDURE — G8400 PT W/DXA NO RESULTS DOC: HCPCS | Performed by: INTERNAL MEDICINE

## 2023-10-30 PROCEDURE — 1090F PRES/ABSN URINE INCON ASSESS: CPT | Performed by: INTERNAL MEDICINE

## 2023-10-30 PROCEDURE — 1123F ACP DISCUSS/DSCN MKR DOCD: CPT | Performed by: INTERNAL MEDICINE

## 2023-10-30 PROCEDURE — G8427 DOCREV CUR MEDS BY ELIG CLIN: HCPCS | Performed by: INTERNAL MEDICINE

## 2023-10-30 PROCEDURE — 99214 OFFICE O/P EST MOD 30 MIN: CPT | Performed by: INTERNAL MEDICINE

## 2023-10-30 PROCEDURE — 1036F TOBACCO NON-USER: CPT | Performed by: INTERNAL MEDICINE

## 2023-10-30 PROCEDURE — G8484 FLU IMMUNIZE NO ADMIN: HCPCS | Performed by: INTERNAL MEDICINE

## 2023-10-30 PROCEDURE — 3074F SYST BP LT 130 MM HG: CPT | Performed by: INTERNAL MEDICINE

## 2023-10-30 PROCEDURE — G8417 CALC BMI ABV UP PARAM F/U: HCPCS | Performed by: INTERNAL MEDICINE

## 2023-10-30 PROCEDURE — 3078F DIAST BP <80 MM HG: CPT | Performed by: INTERNAL MEDICINE

## 2023-10-30 NOTE — TELEPHONE ENCOUNTER
AVS from 10/30/23      RV 6 months    Rv sched for 4/29 @ 230    Pt was given AVS and appointment schedule    Electronically signed by Kyle Angulo on 10/30/2023 at 2:53 PM

## 2023-10-30 NOTE — PROGRESS NOTES
paralysis. PAST SURGICAL HISTORY: has a past surgical history that includes Colonoscopy (01/20/2012); Breast lumpectomy (Left); Hysterectomy, total abdominal; US BREAST BIOPSY W LOC DEVICE 1ST LESION LEFT (Left, 09/04/2020); US BREAST BIOPSY W LOC DEVICE 1ST LESION LEFT (9/4/2020); Appendectomy; US PLACE BREAST LOC DEVICE 1ST LESION LEFT (Left, 5/18/2021); Breast lumpectomy (Left, 5/18/2021); Thyroidectomy (2015); US BREAST BIOPSY W LOC DEVICE 1ST LESION LEFT (Left, 4/3/2023); US BREAST BIOPSY W LOC DEVICE EACH ADDL LESION LEFT (Left, 4/3/2023); US BREAST BIOPSY W LOC DEVICE EACH ADDL LESION LEFT (Left, 4/3/2023); and Mastectomy (Bilateral, 6/15/2023). CURRENT MEDICATIONS:  has a current medication list which includes the following prescription(s): exemestane, omega-3, cyanocobalamin, albuterol sulfate hfa, loratadine, ascorbic acid, vitamin d, hydrochlorothiazide, atenolol, levothyroxine, aspirin, and nifedipine. ALLERGIES:  is allergic to sulfa antibiotics. FAMILY HISTORY: sister breast cancer 45s, otherwise negative for any hematological or oncological conditions. SOCIAL HISTORY:  reports that she quit smoking about 33 years ago. Her smoking use included cigarettes. She has never used smokeless tobacco. She reports current alcohol use. She reports that she does not use drugs. REVIEW OF SYSTEMS:     General: No weakness or fatigue. No unanticipated weight loss or decreased appetite. No fever or chills. Eyes: No blurred vision, eye pain or double vision. Ears: No hearing problems or drainage. No tinnitus. Throat: No sore throat, problems with swallowing or dysphagia. Respiratory: No cough, sputum or hemoptysis. No shortness of breath. No pleuritic chest pain. Cardiovascular: No chest pain, orthopnea or PND. No lower extremity edema. No palpitation. Gastrointestinal: No problems with swallowing. No abdominal pain or bloating. No nausea or vomiting. No diarrhea or constipation.  No GI

## 2023-11-29 ENCOUNTER — TELEPHONE (OUTPATIENT)
Dept: ONCOLOGY | Age: 83
End: 2023-11-29

## 2024-04-29 ENCOUNTER — TELEPHONE (OUTPATIENT)
Dept: ONCOLOGY | Age: 84
End: 2024-04-29

## 2024-04-29 ENCOUNTER — OFFICE VISIT (OUTPATIENT)
Dept: ONCOLOGY | Age: 84
End: 2024-04-29
Payer: MEDICARE

## 2024-04-29 VITALS
BODY MASS INDEX: 30.81 KG/M2 | WEIGHT: 202.6 LBS | HEART RATE: 76 BPM | RESPIRATION RATE: 18 BRPM | SYSTOLIC BLOOD PRESSURE: 164 MMHG | OXYGEN SATURATION: 90 % | TEMPERATURE: 96.8 F | DIASTOLIC BLOOD PRESSURE: 79 MMHG

## 2024-04-29 DIAGNOSIS — C50.912 LOBULAR CARCINOMA OF LEFT BREAST (HCC): Primary | ICD-10-CM

## 2024-04-29 PROCEDURE — 99214 OFFICE O/P EST MOD 30 MIN: CPT | Performed by: INTERNAL MEDICINE

## 2024-04-29 PROCEDURE — 1123F ACP DISCUSS/DSCN MKR DOCD: CPT | Performed by: INTERNAL MEDICINE

## 2024-04-29 PROCEDURE — G8400 PT W/DXA NO RESULTS DOC: HCPCS | Performed by: INTERNAL MEDICINE

## 2024-04-29 PROCEDURE — G8427 DOCREV CUR MEDS BY ELIG CLIN: HCPCS | Performed by: INTERNAL MEDICINE

## 2024-04-29 PROCEDURE — G8417 CALC BMI ABV UP PARAM F/U: HCPCS | Performed by: INTERNAL MEDICINE

## 2024-04-29 PROCEDURE — 3077F SYST BP >= 140 MM HG: CPT | Performed by: INTERNAL MEDICINE

## 2024-04-29 PROCEDURE — 1036F TOBACCO NON-USER: CPT | Performed by: INTERNAL MEDICINE

## 2024-04-29 PROCEDURE — 99211 OFF/OP EST MAY X REQ PHY/QHP: CPT | Performed by: INTERNAL MEDICINE

## 2024-04-29 PROCEDURE — 3078F DIAST BP <80 MM HG: CPT | Performed by: INTERNAL MEDICINE

## 2024-04-29 PROCEDURE — 1090F PRES/ABSN URINE INCON ASSESS: CPT | Performed by: INTERNAL MEDICINE

## 2024-04-29 NOTE — TELEPHONE ENCOUNTER
RV 6 months       Rv scheduled for 11/4/24 @ 2:00pm    No labs listed    PT was given AVS and appt schedule    Electronically signed by Trista Crandall on 4/29/2024 at 2:50 PM

## 2024-11-25 ENCOUNTER — OFFICE VISIT (OUTPATIENT)
Dept: ONCOLOGY | Age: 84
End: 2024-11-25
Payer: MEDICARE

## 2024-11-25 ENCOUNTER — TELEPHONE (OUTPATIENT)
Dept: ONCOLOGY | Age: 84
End: 2024-11-25

## 2024-11-25 VITALS
DIASTOLIC BLOOD PRESSURE: 78 MMHG | HEART RATE: 71 BPM | WEIGHT: 199.6 LBS | SYSTOLIC BLOOD PRESSURE: 164 MMHG | TEMPERATURE: 96.8 F | RESPIRATION RATE: 18 BRPM | OXYGEN SATURATION: 91 % | BODY MASS INDEX: 30.36 KG/M2

## 2024-11-25 DIAGNOSIS — C50.912 LOBULAR CARCINOMA OF LEFT BREAST (HCC): Primary | ICD-10-CM

## 2024-11-25 PROCEDURE — 1125F AMNT PAIN NOTED PAIN PRSNT: CPT | Performed by: INTERNAL MEDICINE

## 2024-11-25 PROCEDURE — G8417 CALC BMI ABV UP PARAM F/U: HCPCS | Performed by: INTERNAL MEDICINE

## 2024-11-25 PROCEDURE — 1159F MED LIST DOCD IN RCRD: CPT | Performed by: INTERNAL MEDICINE

## 2024-11-25 PROCEDURE — 1036F TOBACCO NON-USER: CPT | Performed by: INTERNAL MEDICINE

## 2024-11-25 PROCEDURE — 3077F SYST BP >= 140 MM HG: CPT | Performed by: INTERNAL MEDICINE

## 2024-11-25 PROCEDURE — 99214 OFFICE O/P EST MOD 30 MIN: CPT | Performed by: INTERNAL MEDICINE

## 2024-11-25 PROCEDURE — G8400 PT W/DXA NO RESULTS DOC: HCPCS | Performed by: INTERNAL MEDICINE

## 2024-11-25 PROCEDURE — 99211 OFF/OP EST MAY X REQ PHY/QHP: CPT | Performed by: INTERNAL MEDICINE

## 2024-11-25 PROCEDURE — G8484 FLU IMMUNIZE NO ADMIN: HCPCS | Performed by: INTERNAL MEDICINE

## 2024-11-25 PROCEDURE — 1090F PRES/ABSN URINE INCON ASSESS: CPT | Performed by: INTERNAL MEDICINE

## 2024-11-25 PROCEDURE — 3078F DIAST BP <80 MM HG: CPT | Performed by: INTERNAL MEDICINE

## 2024-11-25 PROCEDURE — G8427 DOCREV CUR MEDS BY ELIG CLIN: HCPCS | Performed by: INTERNAL MEDICINE

## 2024-11-25 PROCEDURE — 1123F ACP DISCUSS/DSCN MKR DOCD: CPT | Performed by: INTERNAL MEDICINE

## 2024-11-25 NOTE — PROGRESS NOTES
paralysis.    PAST SURGICAL HISTORY: has a past surgical history that includes Colonoscopy (01/20/2012); Breast lumpectomy (Left); Hysterectomy, total abdominal; US BREAST BIOPSY W LOC DEVICE 1ST LESION LEFT (Left, 09/04/2020); US BREAST BIOPSY W LOC DEVICE 1ST LESION LEFT (9/4/2020); Appendectomy; US PLACE BREAST LOC DEVICE 1ST LESION LEFT (Left, 5/18/2021); Breast lumpectomy (Left, 5/18/2021); Thyroidectomy (2015); US BREAST BIOPSY W LOC DEVICE 1ST LESION LEFT (Left, 4/3/2023); US BREAST BIOPSY W LOC DEVICE EACH ADDL LESION LEFT (Left, 4/3/2023); US BREAST BIOPSY W LOC DEVICE EACH ADDL LESION LEFT (Left, 4/3/2023); and Mastectomy (Bilateral, 6/15/2023).     CURRENT MEDICATIONS:  has a current medication list which includes the following prescription(s): omega-3, cyanocobalamin, albuterol sulfate hfa, loratadine, ascorbic acid, vitamin d, hydrochlorothiazide, atenolol, levothyroxine, aspirin, nifedipine, and exemestane.    ALLERGIES:  is allergic to sulfa antibiotics.    FAMILY HISTORY: sister breast cancer 40s, otherwise negative for any hematological or oncological conditions.     SOCIAL HISTORY:  reports that she quit smoking about 34 years ago. Her smoking use included cigarettes. She has never used smokeless tobacco. She reports current alcohol use. She reports that she does not use drugs.    REVIEW OF SYSTEMS:     General: No weakness or fatigue. No unanticipated weight loss or decreased appetite. No fever or chills.   Eyes: No blurred vision, eye pain or double vision.   Ears: No hearing problems or drainage. No tinnitus.   Throat: No sore throat, problems with swallowing or dysphagia.   Respiratory: No cough, sputum or hemoptysis. No shortness of breath. No pleuritic chest pain.     Cardiovascular: No chest pain, orthopnea or PND. No lower extremity edema. No palpitation.   Gastrointestinal: No problems with swallowing. No abdominal pain or bloating. No nausea or vomiting. No diarrhea or constipation. No GI

## 2025-06-02 ENCOUNTER — OFFICE VISIT (OUTPATIENT)
Age: 85
End: 2025-06-02
Payer: MEDICARE

## 2025-06-02 ENCOUNTER — TELEPHONE (OUTPATIENT)
Age: 85
End: 2025-06-02

## 2025-06-02 VITALS
RESPIRATION RATE: 18 BRPM | DIASTOLIC BLOOD PRESSURE: 83 MMHG | WEIGHT: 202.2 LBS | BODY MASS INDEX: 30.75 KG/M2 | TEMPERATURE: 98.6 F | SYSTOLIC BLOOD PRESSURE: 152 MMHG | HEART RATE: 70 BPM | OXYGEN SATURATION: 91 %

## 2025-06-02 DIAGNOSIS — C50.912 LOBULAR CARCINOMA OF LEFT BREAST (HCC): Primary | ICD-10-CM

## 2025-06-02 DIAGNOSIS — L91.0 KELOID: ICD-10-CM

## 2025-06-02 PROCEDURE — 1036F TOBACCO NON-USER: CPT | Performed by: INTERNAL MEDICINE

## 2025-06-02 PROCEDURE — G8400 PT W/DXA NO RESULTS DOC: HCPCS | Performed by: INTERNAL MEDICINE

## 2025-06-02 PROCEDURE — 3077F SYST BP >= 140 MM HG: CPT | Performed by: INTERNAL MEDICINE

## 2025-06-02 PROCEDURE — 3079F DIAST BP 80-89 MM HG: CPT | Performed by: INTERNAL MEDICINE

## 2025-06-02 PROCEDURE — G8417 CALC BMI ABV UP PARAM F/U: HCPCS | Performed by: INTERNAL MEDICINE

## 2025-06-02 PROCEDURE — 1123F ACP DISCUSS/DSCN MKR DOCD: CPT | Performed by: INTERNAL MEDICINE

## 2025-06-02 PROCEDURE — G8428 CUR MEDS NOT DOCUMENT: HCPCS | Performed by: INTERNAL MEDICINE

## 2025-06-02 PROCEDURE — 1125F AMNT PAIN NOTED PAIN PRSNT: CPT | Performed by: INTERNAL MEDICINE

## 2025-06-02 PROCEDURE — 99214 OFFICE O/P EST MOD 30 MIN: CPT | Performed by: INTERNAL MEDICINE

## 2025-06-02 PROCEDURE — 1090F PRES/ABSN URINE INCON ASSESS: CPT | Performed by: INTERNAL MEDICINE

## 2025-06-02 NOTE — TELEPHONE ENCOUNTER
Instructions   from Dr. Breanne Manzano MD    RV 6 months  Refer to radiation oncology for Keloid      RV 11/24/25 at 1 pm  RO 6/9/25 at 1pm

## 2025-06-02 NOTE — PROGRESS NOTES
_  Chief Complaint   Patient presents with    Follow-up     Review status of disease     Other     Keloids under breast causing pain and irritation       DIAGNOSIS:        Stage T2 a N0 M0 left breast lower outer quadrant invasive lobular carcinoma, ER positive, MT positive, HER-2/janiya not amplified.  Evidence of local recurrence of the left breast lobular carcinoma.  Biopsy-proven April 3, 2023  Status post bilateral mastectomy with left breast T1 cN0 M0 invasive lobular carcinoma ER positive, MT positive, HER2/janiya not amplified  Sarcoidosis  Multiple comorbidities as listed   CURRENT THERAPY:         Status post left lumpectomy May 18, 2021  Declined radiation therapy  Initially started on Arimidex by her surgeon and she could not tolerate it  Prescribed Femara but she decided to stop it 3 weeks after initiating treatment due to side effects  Declines any further endocrine therapy  Status post bilateral mastectomy Shannan 15, 2023  Agreed on trying Aromasin July 2023. Could not tolerate it.   BRIEF CASE HISTORY:       Ms. Sumi Ziegler is a very pleasant 85 y.o. female with history of multiple comorbidities as listed.  The patient had bilateral screening mammogram August 20, 2020.  She was noted to have suspicious mass in the left breast.  Diagnostic mammogram and ultrasound and biopsy done September 4, 2020.  Patient was noted to have suspicious 1 cm mass in the left breast lower outer quadrant.  Biopsy showed invasive lobular carcinoma.  ER positive, MT positive, HER-2/janiya not amplified.  Patient was evaluated by her surgeon but she did not want to have the surgery due to the corona.  She was started on endocrine therapy with Arimidex.  Patient started treatment in February 2021 due to increasing side effects.  Subsequently she was evaluated again by her surgeon and she had lumpectomy 5/18/2021.  Final pathology results showed 2.4 cm invasive lobular carcinoma with clear margins.  [place] lymph node

## 2025-06-09 ENCOUNTER — HOSPITAL ENCOUNTER (OUTPATIENT)
Dept: RADIATION ONCOLOGY | Age: 85
Discharge: HOME | End: 2025-06-09
Payer: MEDICARE

## 2025-06-09 VITALS
DIASTOLIC BLOOD PRESSURE: 77 MMHG | RESPIRATION RATE: 20 BRPM | HEART RATE: 71 BPM | TEMPERATURE: 97.8 F | WEIGHT: 199 LBS | SYSTOLIC BLOOD PRESSURE: 168 MMHG | BODY MASS INDEX: 30.26 KG/M2

## 2025-06-09 PROCEDURE — 99211 OFF/OP EST MAY X REQ PHY/QHP: CPT | Performed by: RADIOLOGY

## 2025-06-09 PROCEDURE — 99214 OFFICE O/P EST MOD 30 MIN: CPT | Performed by: RADIOLOGY

## 2025-06-09 PROCEDURE — 31505 DIAGNOSTIC LARYNGOSCOPY: CPT | Performed by: RADIOLOGY

## 2025-06-09 PROCEDURE — 99212 OFFICE O/P EST SF 10 MIN: CPT | Performed by: RADIOLOGY

## 2025-06-09 PROCEDURE — 99215 OFFICE O/P EST HI 40 MIN: CPT | Performed by: RADIOLOGY

## 2025-06-09 PROCEDURE — 31575 DIAGNOSTIC LARYNGOSCOPY: CPT | Performed by: RADIOLOGY

## 2025-06-09 PROCEDURE — 99213 OFFICE O/P EST LOW 20 MIN: CPT | Performed by: RADIOLOGY

## 2025-06-11 NOTE — CONSULTS
on 6/9/2025 at 12:35 PM      Drugs Prescribed:  New Prescriptions    No medications on file       Orders Placed:   No orders of the defined types were placed in this encounter.        CC:  Patient Care Team:  Lai Escamilla MD as PCP - General         Total time spent on this case on the day of encounter is 60 minutes. This time includes combination of one or more of the following - review of necessary tests, review of pertinent medical records from the EMR, performing medically appropriate examination and evaluation, counseling and educating the patient/family/caregiver, ordering necessary medical tests, procedures etc., documenting the clinical information in the electronic medical record, care coordination, referring and communicating with other health care providers and interpretation of results independently. This note is created with the assistance of a speech recognition program.  While intending to generate a document that actually reflects the content of the visit, the document can still have some errors including those of syntax and sound a like substitutions which may escape proof reading.  It such instances, actual meaning can be extrapolated by contextual diversion.

## (undated) DEVICE — BANDAGE,GAUZE,BULKEE II,4.5"X4.1YD,STRL: Brand: MEDLINE

## (undated) DEVICE — SYRINGE 20ML LL S/C 50

## (undated) DEVICE — SUTURE PROL SZ 3-0 L30IN NONABSORBABLE BLU L26MM SH 1/2 CIR 8832H

## (undated) DEVICE — SHEARS ENDOSCP L9CM CRV HARM FOCS +

## (undated) DEVICE — SUTURE MCRYL SZ 4-0 L27IN ABSRB UD L19MM PS-2 1/2 CIR PRIM Y426H

## (undated) DEVICE — SUTURE PDS II SZ 3-0 L27IN ABSRB CLR SH L26MM 1/2 CIR TAPR Z416H

## (undated) DEVICE — TOWEL,OR,DSP,ST,BLUE,DLX,XR,4/PK,20PK/CS: Brand: MEDLINE

## (undated) DEVICE — SPONGE LAP W18XL18IN WHT COT 4 PLY FLD STRUNG RADPQ DISP ST 2 PER PACK

## (undated) DEVICE — SUTURE VCRL SZ 3-0 L54IN ABSRB UD LIGAPAK REEL POLYGLACTIN J285G

## (undated) DEVICE — MARKER,SKIN,WI/RULER AND LABELS: Brand: MEDLINE

## (undated) DEVICE — SURGICAL PROCEDURE KIT BASIN MAJ SET UP

## (undated) DEVICE — NEEDLE HYPO 25GA L1.5IN BLU POLYPR HUB S STL REG BVL STR

## (undated) DEVICE — SUTURE PERMAHAND SZ 3-0 L30IN NONABSORBABLE BLK SH L26MM K832H

## (undated) DEVICE — SUTURE VCRL SZ 3-0 L27IN ABSRB UD L26MM SH 1/2 CIR J416H

## (undated) DEVICE — SUTURE V-LOC 180 SZ 3-0 L6IN ABSRB GRN V-20 L26MM 1/2 CIR VLOCL0604

## (undated) DEVICE — BLANKET WRM W40.2XL55.9IN IORT LO BODY + MISTRAL AIR

## (undated) DEVICE — SKIN PREP TRAY W/CHG: Brand: MEDLINE INDUSTRIES, INC.

## (undated) DEVICE — GLOVE SURG SZ 65 CRM LTX FREE POLYISOPRENE POLYMER BEAD ANTI

## (undated) DEVICE — DRESSING TRNSPAR W5XL4.5IN FLM SHT SEMIPERMEABLE WIND

## (undated) DEVICE — DRAPE IRRIG FLD WRM W44XL44IN W/ AORN STD PRTBL INTRATEMP

## (undated) DEVICE — SURGICAL BRA THERAPEUTIC BREAST SUPPORT 4XL: Brand: CARDINAL HEALTH

## (undated) DEVICE — PAD,ABDOMINAL,5"X9",ST,LF,25/BX: Brand: MEDLINE INDUSTRIES, INC.

## (undated) DEVICE — DRAPE,REIN 53X77,STERILE: Brand: MEDLINE

## (undated) DEVICE — GLOVE SURG SZ 6 CRM LTX FREE POLYISOPRENE POLYMER BEAD ANTI

## (undated) DEVICE — Device

## (undated) DEVICE — YANKAUER,BULB TIP,W/O VENT,RIGID,STERILE: Brand: MEDLINE

## (undated) DEVICE — BANDAGE COMPR W6INXL5YD WHT BGE POLY COT M E WRP WV HK AND

## (undated) DEVICE — BLADE ES ELASTOMERIC COAT INSUL DURABLE BEND UPTO 90DEG

## (undated) DEVICE — GRID BX L4.65IN RADLUC FOR SAFE IMAG TRNSPRT PROC BRST SPEC

## (undated) DEVICE — PAD,NON-ADHERENT,3X8,STERILE,LF,1/PK: Brand: MEDLINE

## (undated) DEVICE — GARMENT,MEDLINE,DVT,INT,CALF,MED, GEN2: Brand: MEDLINE

## (undated) DEVICE — SYRINGE, LUER LOCK, 10ML: Brand: MEDLINE

## (undated) DEVICE — LIQUIBAND RAPID ADHESIVE 36/CS 0.8ML: Brand: MEDLINE

## (undated) DEVICE — DRAIN SURG 19FR 100% SIL RADPQ RND CHN FULL FLUT

## (undated) DEVICE — EVACUATOR SURG 100CC SIL BLB SUCT RESVR FOR CLS WND DRNGE

## (undated) DEVICE — KIT EXP NDL L8IN SOAK CATH L10IN T PEEL ON-Q PAINBUSTER

## (undated) DEVICE — ADHESIVE SKIN CLSR 0.7ML TOP DERMBND ADV

## (undated) DEVICE — SUTURE MCRYL SZ 3-0 L27IN ABSRB UD L26MM SH 1/2 CIR Y416H

## (undated) DEVICE — GOWN,AURORA,NONREINFORCED,LARGE: Brand: MEDLINE

## (undated) DEVICE — INTENDED FOR TISSUE SEPARATION, AND OTHER PROCEDURES THAT REQUIRE A SHARP SURGICAL BLADE TO PUNCTURE OR CUT.: Brand: BARD-PARKER ® CARBON RIB-BACK BLADES

## (undated) DEVICE — SPONGE DRN W4XL4IN RAYON/POLYESTER 6 PLY NONWOVEN PRECUT 2 PER PK

## (undated) DEVICE — SUTURE V-LOC 180 SZ 3-0 L9IN ABSRB GRN L26MM V-20 1/2 CIR VLOCL0644

## (undated) DEVICE — YANKAUER,FLEXIBLE HANDLE,REGLR CAPACITY: Brand: MEDLINE INDUSTRIES, INC.